# Patient Record
Sex: FEMALE | Race: WHITE | NOT HISPANIC OR LATINO | Employment: FULL TIME
[De-identification: names, ages, dates, MRNs, and addresses within clinical notes are randomized per-mention and may not be internally consistent; named-entity substitution may affect disease eponyms.]

---

## 2015-08-03 LAB
HPV_EXT - HISTORICAL: NORMAL
PAP SMEAR - HIM PATIENT REPORTED: NORMAL

## 2017-12-13 ENCOUNTER — RECORDS - HEALTHEAST (OUTPATIENT)
Dept: ADMINISTRATIVE | Facility: OTHER | Age: 37
End: 2017-12-13

## 2019-08-01 ENCOUNTER — OFFICE VISIT - HEALTHEAST (OUTPATIENT)
Dept: FAMILY MEDICINE | Facility: CLINIC | Age: 39
End: 2019-08-01

## 2019-08-01 DIAGNOSIS — M79.641 PAIN OF RIGHT HAND: ICD-10-CM

## 2019-08-01 ASSESSMENT — MIFFLIN-ST. JEOR: SCORE: 1495.86

## 2019-08-22 ENCOUNTER — AMBULATORY - HEALTHEAST (OUTPATIENT)
Dept: FAMILY MEDICINE | Facility: CLINIC | Age: 39
End: 2019-08-22

## 2019-08-22 ENCOUNTER — OFFICE VISIT - HEALTHEAST (OUTPATIENT)
Dept: FAMILY MEDICINE | Facility: CLINIC | Age: 39
End: 2019-08-22

## 2019-08-22 DIAGNOSIS — J45.990 EXERCISE-INDUCED ASTHMA: ICD-10-CM

## 2019-08-22 DIAGNOSIS — E66.811 CLASS 1 OBESITY DUE TO EXCESS CALORIES WITHOUT SERIOUS COMORBIDITY WITH BODY MASS INDEX (BMI) OF 33.0 TO 33.9 IN ADULT: ICD-10-CM

## 2019-08-22 DIAGNOSIS — E66.09 CLASS 1 OBESITY DUE TO EXCESS CALORIES WITHOUT SERIOUS COMORBIDITY WITH BODY MASS INDEX (BMI) OF 33.0 TO 33.9 IN ADULT: ICD-10-CM

## 2019-08-22 DIAGNOSIS — Z00.00 ROUTINE GENERAL MEDICAL EXAMINATION AT A HEALTH CARE FACILITY: ICD-10-CM

## 2019-08-22 DIAGNOSIS — M79.641 PAIN OF RIGHT HAND: ICD-10-CM

## 2019-08-22 RX ORDER — ALBUTEROL SULFATE 90 UG/1
1-2 AEROSOL, METERED RESPIRATORY (INHALATION) EVERY 4 HOURS PRN
Qty: 1 INHALER | Refills: 0 | Status: SHIPPED | OUTPATIENT
Start: 2019-08-22 | End: 2022-12-12

## 2019-08-22 ASSESSMENT — MIFFLIN-ST. JEOR: SCORE: 1494.47

## 2019-09-03 ENCOUNTER — RECORDS - HEALTHEAST (OUTPATIENT)
Dept: HEALTH INFORMATION MANAGEMENT | Facility: CLINIC | Age: 39
End: 2019-09-03

## 2019-12-16 ENCOUNTER — COMMUNICATION - HEALTHEAST (OUTPATIENT)
Dept: HEALTH INFORMATION MANAGEMENT | Facility: CLINIC | Age: 39
End: 2019-12-16

## 2020-07-23 ENCOUNTER — COMMUNICATION - HEALTHEAST (OUTPATIENT)
Dept: SCHEDULING | Facility: CLINIC | Age: 40
End: 2020-07-23

## 2020-07-24 ENCOUNTER — OFFICE VISIT - HEALTHEAST (OUTPATIENT)
Dept: FAMILY MEDICINE | Facility: CLINIC | Age: 40
End: 2020-07-24

## 2020-07-24 DIAGNOSIS — M25.511 ACUTE PAIN OF RIGHT SHOULDER: ICD-10-CM

## 2020-07-24 RX ORDER — NAPROXEN 500 MG/1
500 TABLET ORAL 2 TIMES DAILY WITH MEALS
Qty: 60 TABLET | Refills: 2 | Status: SHIPPED | OUTPATIENT
Start: 2020-07-24

## 2020-09-23 ENCOUNTER — OFFICE VISIT - HEALTHEAST (OUTPATIENT)
Dept: FAMILY MEDICINE | Facility: CLINIC | Age: 40
End: 2020-09-23

## 2020-09-23 DIAGNOSIS — G89.29 CHRONIC RIGHT SHOULDER PAIN: ICD-10-CM

## 2020-09-23 DIAGNOSIS — M25.511 CHRONIC RIGHT SHOULDER PAIN: ICD-10-CM

## 2020-10-16 ENCOUNTER — OFFICE VISIT - HEALTHEAST (OUTPATIENT)
Dept: PHYSICAL THERAPY | Facility: REHABILITATION | Age: 40
End: 2020-10-16

## 2020-10-16 DIAGNOSIS — M25.611 DECREASED ROM OF RIGHT SHOULDER: ICD-10-CM

## 2020-10-16 DIAGNOSIS — M62.81 GENERALIZED MUSCLE WEAKNESS: ICD-10-CM

## 2020-10-16 DIAGNOSIS — G89.29 CHRONIC RIGHT SHOULDER PAIN: ICD-10-CM

## 2020-10-16 DIAGNOSIS — M25.511 CHRONIC RIGHT SHOULDER PAIN: ICD-10-CM

## 2020-10-20 ENCOUNTER — OFFICE VISIT - HEALTHEAST (OUTPATIENT)
Dept: PHYSICAL THERAPY | Facility: REHABILITATION | Age: 40
End: 2020-10-20

## 2020-10-20 DIAGNOSIS — G89.29 CHRONIC RIGHT SHOULDER PAIN: ICD-10-CM

## 2020-10-20 DIAGNOSIS — M25.511 CHRONIC RIGHT SHOULDER PAIN: ICD-10-CM

## 2020-10-20 DIAGNOSIS — M25.611 DECREASED ROM OF RIGHT SHOULDER: ICD-10-CM

## 2020-10-20 DIAGNOSIS — M62.81 GENERALIZED MUSCLE WEAKNESS: ICD-10-CM

## 2020-10-27 ENCOUNTER — OFFICE VISIT - HEALTHEAST (OUTPATIENT)
Dept: PHYSICAL THERAPY | Facility: REHABILITATION | Age: 40
End: 2020-10-27

## 2020-10-27 DIAGNOSIS — G89.29 CHRONIC RIGHT SHOULDER PAIN: ICD-10-CM

## 2020-10-27 DIAGNOSIS — M25.611 DECREASED ROM OF RIGHT SHOULDER: ICD-10-CM

## 2020-10-27 DIAGNOSIS — M25.511 CHRONIC RIGHT SHOULDER PAIN: ICD-10-CM

## 2020-10-27 DIAGNOSIS — M62.81 GENERALIZED MUSCLE WEAKNESS: ICD-10-CM

## 2020-10-30 ENCOUNTER — OFFICE VISIT - HEALTHEAST (OUTPATIENT)
Dept: PHYSICAL THERAPY | Facility: REHABILITATION | Age: 40
End: 2020-10-30

## 2020-10-30 DIAGNOSIS — M62.81 GENERALIZED MUSCLE WEAKNESS: ICD-10-CM

## 2020-10-30 DIAGNOSIS — G89.29 CHRONIC RIGHT SHOULDER PAIN: ICD-10-CM

## 2020-10-30 DIAGNOSIS — M25.511 CHRONIC RIGHT SHOULDER PAIN: ICD-10-CM

## 2020-10-30 DIAGNOSIS — M25.611 DECREASED ROM OF RIGHT SHOULDER: ICD-10-CM

## 2020-11-03 ENCOUNTER — OFFICE VISIT - HEALTHEAST (OUTPATIENT)
Dept: PHYSICAL THERAPY | Facility: REHABILITATION | Age: 40
End: 2020-11-03

## 2020-11-03 DIAGNOSIS — M25.611 DECREASED ROM OF RIGHT SHOULDER: ICD-10-CM

## 2020-11-03 DIAGNOSIS — M25.511 CHRONIC RIGHT SHOULDER PAIN: ICD-10-CM

## 2020-11-03 DIAGNOSIS — M62.81 GENERALIZED MUSCLE WEAKNESS: ICD-10-CM

## 2020-11-03 DIAGNOSIS — G89.29 CHRONIC RIGHT SHOULDER PAIN: ICD-10-CM

## 2020-11-06 ENCOUNTER — OFFICE VISIT - HEALTHEAST (OUTPATIENT)
Dept: PHYSICAL THERAPY | Facility: REHABILITATION | Age: 40
End: 2020-11-06

## 2020-11-06 DIAGNOSIS — M25.511 CHRONIC RIGHT SHOULDER PAIN: ICD-10-CM

## 2020-11-06 DIAGNOSIS — M62.81 GENERALIZED MUSCLE WEAKNESS: ICD-10-CM

## 2020-11-06 DIAGNOSIS — M25.611 DECREASED ROM OF RIGHT SHOULDER: ICD-10-CM

## 2020-11-06 DIAGNOSIS — G89.29 CHRONIC RIGHT SHOULDER PAIN: ICD-10-CM

## 2020-11-13 ENCOUNTER — OFFICE VISIT - HEALTHEAST (OUTPATIENT)
Dept: PHYSICAL THERAPY | Facility: REHABILITATION | Age: 40
End: 2020-11-13

## 2020-11-13 DIAGNOSIS — M25.511 CHRONIC RIGHT SHOULDER PAIN: ICD-10-CM

## 2020-11-13 DIAGNOSIS — G89.29 CHRONIC RIGHT SHOULDER PAIN: ICD-10-CM

## 2020-11-13 DIAGNOSIS — M25.611 DECREASED ROM OF RIGHT SHOULDER: ICD-10-CM

## 2020-11-13 DIAGNOSIS — M62.81 GENERALIZED MUSCLE WEAKNESS: ICD-10-CM

## 2020-11-20 ENCOUNTER — OFFICE VISIT - HEALTHEAST (OUTPATIENT)
Dept: PHYSICAL THERAPY | Facility: REHABILITATION | Age: 40
End: 2020-11-20

## 2020-11-20 DIAGNOSIS — M25.611 DECREASED ROM OF RIGHT SHOULDER: ICD-10-CM

## 2020-11-20 DIAGNOSIS — M25.511 CHRONIC RIGHT SHOULDER PAIN: ICD-10-CM

## 2020-11-20 DIAGNOSIS — G89.29 CHRONIC RIGHT SHOULDER PAIN: ICD-10-CM

## 2020-11-20 DIAGNOSIS — M62.81 GENERALIZED MUSCLE WEAKNESS: ICD-10-CM

## 2020-11-24 ENCOUNTER — OFFICE VISIT - HEALTHEAST (OUTPATIENT)
Dept: PHYSICAL THERAPY | Facility: REHABILITATION | Age: 40
End: 2020-11-24

## 2020-11-24 DIAGNOSIS — G89.29 CHRONIC RIGHT SHOULDER PAIN: ICD-10-CM

## 2020-11-24 DIAGNOSIS — M25.511 CHRONIC RIGHT SHOULDER PAIN: ICD-10-CM

## 2020-11-24 DIAGNOSIS — M25.611 DECREASED ROM OF RIGHT SHOULDER: ICD-10-CM

## 2020-11-24 DIAGNOSIS — M62.81 GENERALIZED MUSCLE WEAKNESS: ICD-10-CM

## 2020-12-04 ENCOUNTER — OFFICE VISIT - HEALTHEAST (OUTPATIENT)
Dept: PHYSICAL THERAPY | Facility: REHABILITATION | Age: 40
End: 2020-12-04

## 2020-12-04 DIAGNOSIS — M62.81 GENERALIZED MUSCLE WEAKNESS: ICD-10-CM

## 2020-12-04 DIAGNOSIS — G89.29 CHRONIC RIGHT SHOULDER PAIN: ICD-10-CM

## 2020-12-04 DIAGNOSIS — M25.611 DECREASED ROM OF RIGHT SHOULDER: ICD-10-CM

## 2020-12-04 DIAGNOSIS — M25.511 CHRONIC RIGHT SHOULDER PAIN: ICD-10-CM

## 2020-12-11 ENCOUNTER — OFFICE VISIT - HEALTHEAST (OUTPATIENT)
Dept: PHYSICAL THERAPY | Facility: REHABILITATION | Age: 40
End: 2020-12-11

## 2020-12-11 DIAGNOSIS — M25.511 CHRONIC RIGHT SHOULDER PAIN: ICD-10-CM

## 2020-12-11 DIAGNOSIS — M62.81 GENERALIZED MUSCLE WEAKNESS: ICD-10-CM

## 2020-12-11 DIAGNOSIS — M25.611 DECREASED ROM OF RIGHT SHOULDER: ICD-10-CM

## 2020-12-11 DIAGNOSIS — G89.29 CHRONIC RIGHT SHOULDER PAIN: ICD-10-CM

## 2021-05-28 ASSESSMENT — ASTHMA QUESTIONNAIRES: ACT_TOTALSCORE: 25

## 2021-05-31 NOTE — PROGRESS NOTES
ASSESSMENT/PLAN:    Pain of right hand  This is a pleasant 39-year-old female who presented today because of 3 months of right wrist pain and numbness.  The distribution of numbness is along the fourth and fifth finger with pain along the first and second finger of the right hand.  I suspect she may have some element of carpal tunnel syndrome although she does not have the classic presentation.  I recommend conservative therapy for now including wrist brace, overuse of the right wrist, ice, NSAIDs, proper ergonomics, and time.  If she is not better and I recommend seeing orthopedic specialist.  She verbalized understanding and agreed with the plan      CHIEF COMPLAINT:  Chief Complaint   Patient presents with     Hand Pain     right hand feels numb when sleeping. x2-3 month. pointer finger is painful        HISTORY OF PRESENT ILLNESS:  Brenda is a 39 y.o. female presenting to the clinic today for possible risk of carpal tunnel.     Carpal tunnel: The patient reports to be experiencing a numbing sensation on her right hand. She has been noticing it more recently for the past 2-3 months. The patient acknowledges that her pinky and ring finger would fall go numb at night and she would have to shake it out to get rid of the numbing sensation. She states that she struggles to open jars, and her knuckles would hurt when she does. She express tension, tightness and soreness on her shoulder muscles and right upper arm. The patient confirms that she feels pain and pressure on her elbow. The patient states that she has a small bump on the lateral side on her right wrist and wondering if this is contributing to her current symptoms of pain and paresthesia of the hand.  Patient is right-hand dominant.  About 13 years ago she was told she had carpal tunnel in the time she had her infant child.  The symptoms did improve until about 2 and half months ago.  No history of injury.  No change in physical activity.    REVIEW OF  "SYSTEMS:   Positive: numbing sensation on the right hand, knuckle pain, muscle strain.   All other systems are negative.    PFSH:  Works for [employer]ing company    TOBACCO USE:  Social History     Tobacco Use   Smoking Status Never Smoker   Smokeless Tobacco Never Used       VITALS:  Vitals:    08/01/19 1324   BP: 120/82   Pulse: 72   Temp: 97.9  F (36.6  C)   SpO2: 99%   Weight: 188 lb (85.3 kg)   Height: 5' 3.88\" (1.623 m)     Wt Readings from Last 3 Encounters:   08/01/19 188 lb (85.3 kg)   12/20/16 169 lb 6.4 oz (76.8 kg)   10/13/15 155 lb 2 oz (70.4 kg)       PHYSICAL EXAM:    Objective:    Physical Exam   Vitals:    08/01/19 1324   BP: 120/82   Pulse: 72   Temp: 97.9  F (36.6  C)   SpO2: 99%      Constitutional: Patient is oriented to person, place, and time. Patient appears well-developed and well-nourished. No distress.   Head: Normocephalic and atraumatic.   Right Ear: External ear normal.   Left Ear: External ear normal.   Nose: Nose normal.   Eyes: Conjunctivae and EOM are normal.  Right eye exhibits no discharge. Left eye exhibits no discharge. No scleral icterus.   Extremities: 5 mm lipoma on the ulnar aspect of right forearm, negative tingling test, negative fading test.   Phalen (acute wrist flexion maintained for 30 to 60 seconds) was negative     Tinel (tapping over a compressed nerve at the wrist) was negative      Results for orders placed or performed in visit on 12/20/16   Lipid Cascade   Result Value Ref Range    Cholesterol 198 <=199 mg/dL    Triglycerides 102 <=149 mg/dL    HDL Cholesterol 60 >=50 mg/dL    LDL Calculated 118 <=129 mg/dL    Patient Fasting > 8hrs? No    Comprehensive Metabolic Panel   Result Value Ref Range    Sodium 141 136 - 145 mmol/L    Potassium 4.1 3.5 - 5.0 mmol/L    Chloride 107 98 - 107 mmol/L    CO2 26 22 - 31 mmol/L    Anion Gap, Calculation 8 5 - 18 mmol/L    Glucose 99 70 - 125 mg/dL    BUN 11 8 - 22 mg/dL    Creatinine 0.81 0.60 - 1.10 mg/dL    GFR MDRD Af Amer >60 " >60 mL/min/1.73m2    GFR MDRD Non Af Amer >60 >60 mL/min/1.73m2    Bilirubin, Total 0.3 0.0 - 1.0 mg/dL    Calcium 9.7 8.5 - 10.5 mg/dL    Protein, Total 7.1 6.0 - 8.0 g/dL    Albumin 3.9 3.5 - 5.0 g/dL    Alkaline Phosphatase 65 45 - 120 U/L    AST 15 0 - 40 U/L    ALT 18 0 - 45 U/L       QUALITY MEASURES:  I have had an Advance Directives discussion with the patient.    ADDITIONAL HISTORY SUMMARIZED (2): None.  DECISION TO OBTAIN EXTRA INFORMATION (1): None.   RADIOLOGY TESTS (1): None.  LABS (1): None.  MEDICINE TESTS (1): None.  INDEPENDENT REVIEW (2 each): None.     Total data points:0    The visit lasted a total of 17 minutes face to face with the patient. Over 50% of the time was spent counseling and educating the patient about carpal tunnel management.     ICatrina, am scribing for and in the presence of, Dr. Ibarra.    IDr. Ibarra, personally performed the services described in this documentation, as scribed by Catrina Valentine in my presence, and it is both accurate and complete.    MEDICATIONS:  Current Outpatient Medications   Medication Sig Dispense Refill     levonorgestrel-ethinyl estradiol (ENPRESSE) 50-30 (6)/75-40 (5)/125-30(10) per tablet        albuterol (PROVENTIL HFA;VENTOLIN HFA) 90 mcg/actuation inhaler Inhale 1-2 puffs every 4 (four) hours as needed. 1 Inhaler 3     fluticasone (FLOVENT HFA) 110 mcg/actuation inhaler Inhale 2 puffs 2 (two) times a day. 1 Inhaler 0     No current facility-administered medications for this visit.

## 2021-06-03 VITALS — WEIGHT: 188 LBS | BODY MASS INDEX: 32.1 KG/M2 | HEIGHT: 64 IN

## 2021-06-03 VITALS — BODY MASS INDEX: 33.65 KG/M2 | WEIGHT: 189.9 LBS | HEIGHT: 63 IN

## 2021-06-05 VITALS
SYSTOLIC BLOOD PRESSURE: 110 MMHG | WEIGHT: 204.1 LBS | HEART RATE: 64 BPM | BODY MASS INDEX: 35.87 KG/M2 | DIASTOLIC BLOOD PRESSURE: 78 MMHG

## 2021-06-09 NOTE — TELEPHONE ENCOUNTER
Brenda is calling and states that she is having pain in arm right and has been present for 2 months.  Can move arm but when lifting gets shooting pain.  Upper arm is hurting.  Denies injury.  Denies fever cough and shortness of breath.      COVID 19 Nurse Triage Plan/Patient Instructions    Please be aware that novel coronavirus (COVID-19) may be circulating in the community. If you develop symptoms such as fever, cough, or SOB or if you have concerns about the presence of another infection including coronavirus (COVID-19), please contact your health care provider or visit [email or web address].     Disposition/Instructions    Virtual Visit with provider recommended. Reference Visit Selection Guide.    Thank you for taking steps to prevent the spread of this virus.  o Limit your contact with others.  o Wear a simple mask to cover your cough.  o Wash your hands well and often.    Resources    [facility]: About COVID-19: [email or web address]    CDC: What to Do If You're Sick: www.cdc.gov/coronavirus/2019-ncov/about/steps-when-sick.html    CDC: Ending Home Isolation: www.cdc.gov/coronavirus/2019-ncov/hcp/disposition-in-home-patients.html     CDC: Caring for Someone: www.cdc.gov/coronavirus/2019-ncov/if-you-are-sick/care-for-someone.html     [facility]: Interim Guidance for Hospital Discharge to Home: www.health.Pending sale to [facility].[place]/diseases/coronavirus/hcp/hospdischarge.pdf    [facility] clinical trials (COVID-19 research studies): [email or web address]-trials     Below are the COVID-19 hotlines at the [place] Department of Health ([facility]). Interpreters are available.   o For health questions: Call 074-204-3315 or 1-718.903.7334 (7 a.m. to 7 p.m.)  o For questions about schools and childcare: Call 661-845-3506 or 1-738.874.4719 (7 a.m. to 7 p.m.)       Reason for Disposition    [1] MODERATE pain (e.g., interferes with normal activities) AND [2] present > 3 days    Additional Information     "Negative: Shock suspected (e.g., cold/pale/clammy skin, too weak to stand, low BP, rapid pulse)    Negative: [1] Similar pain previously AND [2] it was from \"heart attack\"    Negative: [1] Similar pain previously AND [2] it was from \"angina\" AND [3] not relieved by nitroglycerin    Negative: Sounds like a life-threatening emergency to the triager    Negative: Difficulty breathing or unusual sweating (e.g., sweating without exertion)    Negative: [1] Age > 40 AND [2] associated chest or jaw pain AND [3] pain lasts > 5 minutes    Negative: [1] Age > 40 AND [2] no obvious cause AND [3] pain even when not moving the arm    (Exception: pain is clearly made worse by moving arm or bending neck)    Negative: [1] SEVERE pain AND [2] not improved 2 hours after pain medicine    Negative: [1] Red area or streak AND [2] fever    Negative: [1] Swollen joint AND [2] fever    Negative: Patient sounds very sick or weak to the triager    Negative: [1] Red area or streak AND [2] large (> 2 in. or 5 cm)    Negative: Entire arm is swollen    Negative: [1] Cast on wrist or arm AND [2] now increased pain    Negative: Weakness (i.e., loss of strength) in hand or fingers     (Exception: not truly weak; hand feels weak because of pain)    Negative: [1] Arm pains with exertion (e.g., walking) AND [2] pain goes away on resting AND [3] not present now    Negative: [1] Painful rash AND [2] multiple small blisters grouped together (i.e., dermatomal distribution or \"band\" or \"stripe\")    Negative: Looks like a boil, infected sore, deep ulcer or other infected rash (spreading redness, pus)    Negative: [1] Localized rash is very painful AND [2] no fever    Negative: Localized pain, redness or hard lump along vein    Negative: Numbness (i.e., loss of sensation) in hand or fingers    Protocols used: ARM PAIN-A-AH      "

## 2021-06-09 NOTE — PROGRESS NOTES
"Brenda Euceda is a 39 y.o. female who is being evaluated via a billable video visit.      The patient has been notified of following:     \"This video visit will be conducted via a call between you and your physician/provider. We have found that certain health care needs can be provided without the need for an in-person physical exam.  This service lets us provide the care you need with a video conversation.  If a prescription is necessary we can send it directly to your pharmacy.  If lab work is needed we can place an order for that and you can then stop by our lab to have the test done at a later time.    Video visits are billed at different rates depending on your insurance coverage. Please reach out to your insurance provider with any questions.    If during the course of the call the physician/provider feels a video visit is not appropriate, you will not be charged for this service.\"    Patient has given verbal consent to a Video visit? Yes  How would you like to obtain your AVS? AVS Preference: [email or web address].  If dropped by the video visit, the video invitation should be sent to: Text to cell phone: [phone]  Will anyone else be joining your video visit? No        Video Start Time: 9.34    Additional provider notes:   Patient complaints of right shoulder pain. This is evaluated as a personal injury though there was no injury. The pain is described as aching, sharp and shooting.  The onset of the pain was sudden, not related to any specific activity.  The pain occurs Intermittently especially with certain movements of the right shoulder.  Pain is located in the middle of the lateral aspect of the deltoid muscle.  Movements include stretching her arms behind her trying to unbuckle her bra attempting to  something from her back.  The pain does not last long. . No history of dislocation. Symptoms are aggravated by reaching especially towards the back.  Symptoms are diminished by  rest.   Limited activities " include: reaching behind her, but she does have some discomfort with consistent typing at work especially around the lateral aspect of the elbow. No stiffness is reported. 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  And she has not missed work.  Review of systems:  She does not have any pain in her neck, denies having any numbness or weaknesses.  Noted that she is able to do the other movements of the shoulder.  Does not have any fevers or chills.  No chest pains and no shortness of breath.     On examination:  GENERAL: Healthy, alert and no distress  EYES: Eyes grossly normal to inspection.   RESP: No increased work of breathing.    MS: decreased range of motion mostly limited to internal rotation with extension towards the back.  The rest of the shoulder motions were normal.  NEURO: Mentation and speech appropriate for age.  PSYCH: Mentation appears normal, affect normal/bright, judgement and insight intact, normal speech and appearance well-groomed  Assessment and plan:  1. Acute pain of right shoulder  - naproxen (NAPROSYN) 500 MG tablet; Take 1 tablet (500 mg total) by mouth 2 (two) times a day with meals.  Dispense: 60 tablet; Refill: 2  As the patient does not have any known injuries.  She does have some repetitive movements when she is working, but that does not really appear to be impacting on the shoulder.  I think that she does have some tendinitis may be happened when she was reaching towards the back.  We discussed the management.  Will consider physical therapy but in the meantime she will start to do home exercise regimen and take some NSAIDs and if those are not as helpful she will let me know for referral for physical therapy.    Video-Visit Details    Type of service:  Video Visit    Video End Time (time video stopped): 9:45 AM  Originating Location (pt. Location): Home    Distant Location (provider location):  [facility]/OB     Platform used for Video Visit:  Doximity      Yin Lizarraga MD

## 2021-06-11 NOTE — PROGRESS NOTES
Assessment/plan   Brenda Euceda is a 40 y.o. female with obesity and asthma (exercise induced) who is established to my practice.    Brenda was seen today for arm pain.    Diagnoses and all orders for this visit:    Chronic right shoulder pain  Ongoing pain since late May, now about 4 months.  Suspect frozen shoulder component in addition to a mild tendinitis given impingement symptoms today and restrictive passive range of motion especially with external rotation and limited internal rotation.  Two-week trial of naproxen made no change.  At this point I have advised physical therapy and we can consider imaging down the road if not improving as I do not suspect a bony component at this time.  No known injury.  -     Ambulatory referral to Adult PT- Internal    Follow up: 2 months, recheck/physical exam    Monica Luna MD    Subjective:      HPI: Brenda Euceda is a 40 y.o. female who is here for:    Chief Complaint   Patient presents with     Arm Pain     ongoing since May, not getting any better from 7/24 virtual visit       Right shoulder pain: Ongoing since May, right before Memorial day weekend.  Gradually worsening.  In July she had a virtual visit.     No known injury. The pain is described as dull, aching, sharp and shooting.  The pain occurs Intermittently especially with certain movements of the right shoulder, such as when she tries to lift her arm up along her ear, or externally rotating as if to throw Frisbee or trying to unbuckle her bra strap from the back.  Anything that stretches her arms behind her really cause discomfort and she feels restricted range of motion.  The pain does not last long but she does report about 45 seconds of her arm feeling limp and funny after that.  It then goes back to normal.    Pain is located in the middle of the lateral aspect of the deltoid muscle.  No history of dislocation. Symptoms are aggravated by reaching especially towards the back. Symptoms are  diminished by  rest.   No change with naproxen for 2 weeks in July.    Limited activities include: reaching behind her, but she does have some discomfort with consistent typing at work especially around the lateral aspect of the elbow (previous issues with cubital tunnel syndrome but not currently bothering her). No stiffness is reported. 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  And she has not missed work.    Review of systems:  She does not have any pain in her neck, denies having any numbness or weaknesses.  Noted that she is able to do the other movements of the shoulder.  Does not have any fevers or chills.  No chest pains and no shortness of breath.      Review of Systems:  Her asthma has been well controlled, ACT today is 25.  Not exercising much which is generally her trigger.  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  She is , has 3 daughters.  2 of her children are in competitive dance.  She is not physically active at this point but is interested to start using her home elliptical machine.  If no alcohol use.  No smoking or drug use.    Monica Luna MD       Medical History:   Problem list, PMH, PSH, FHx and allergies were reviewed and updated accordingly in EMR.    Medications:  Current Outpatient Medications   Medication Sig Dispense Refill     albuterol (PROAIR HFA;PROVENTIL HFA;VENTOLIN HFA) 90 mcg/actuation inhaler Inhale 1-2 puffs every 4 (four) hours as needed. 1 Inhaler 0     levonorgestrel-ethinyl estradiol (ENPRESSE) 50-30 (6)/75-40 (5)/125-30(10) per tablet        naproxen (NAPROSYN) 500 MG tablet Take 1 tablet (500 mg total) by mouth 2 (two) times a day with meals. 60 tablet 2     No current facility-administered medications for this visit.        Imaging & Labs reviewed this visit:     No results found for:  HGBA1C  No results found for this or any previous visit.  Lab Results   Component Value Date    TSH 1.1 05/01/2014         Objective:      Vitals:    09/23/20 0858   BP: 110/78   Pulse: 64   Weight: 204 lb 1.6 oz (92.6 kg)       Physical Exam:     General: Alert, no acute distress.   HEENT: normocephalic conjunctivae are clear  Neck: supple without adenopathy or thyromegaly.  Lungs: Good aeration bilaterally. Clear to auscultation without wheezes, rales or rhonci.   Heart: regular rate and rhythm, normal S1 and S2, no murmurs  Abdomen: soft and nontender  Skin: clear without rash or lesions  Neuro: alert, interactive moving all extremities equally, normal muscle tone in all 4 extremities  right Shoulder:  non-specific diffuse tenderness about the shoulder, negative for tenderness over the acromioclavicular joint, positive for impingement sign and radial pulse intact   She has limited passive range of motion at the end of her range of motion for shoulder flexion/extension and internal/external rotation.  Positive Kelley Albino and a painful arc.  Negative drop arm sign.    Monica Luna MD

## 2021-06-12 NOTE — PROGRESS NOTES
[facility] Daily Progress Progress Note    Patient Name: Brenda Euceda  Date: 10/20/2020  Visit #: 2  PTA visit #:  -  Referral Diagnosis: shoulder  Referring provider: Monica Luna MD  Visit Diagnosis:     ICD-10-CM    1. Chronic right shoulder pain  M25.511     G89.29    2. Decreased ROM of right shoulder  M25.611    3. Generalized muscle weakness  M62.81        No past medical history on file.      Assessment:   Patient comes in for first f/u appointment today. She tolerated session well but did get some referred symptoms into her right elbow arm with shoulder extension today. Her exercise was modified to elbow extended and this seemed to feel better. Will continue to work on ROM, strengthening, and decreasing inflammation.    HEP/POC compliance is  good .  Patient demonstrates understanding/independence with home program.  Patient is benefitting from skilled physical therapy and is making steady progress toward functional goals.  Patient is appropriate to continue with skilled physical therapy intervention, as indicated by initial plan of care.    Goal Status:  Pt. will demonstrate/verbalize independence in self-management of condition in : 12 weeks  Pt. will be independent with home exercise program in : 6 weeks  Pt. will have improved quality of sleep: with less pain;waking less times/night;in 6 weeks    Pt will: have pain-free R shoulder AROM within 10 weeks in order to return to PLOF.  Pt will: be able to dress/groom without difficulty from R shoulder within 10 weeks in order to improve her QOL.      Plan / Patient Education:     Continue with initial plan of care.  Progress with home program as tolerated.    Plan for next visit: pulley's, did she get pulley's? Re-test motion. How are exs going? Mobs. Shoulder flex and scaption to shoulder height    Subjective:     Pain Rating: 3-4    Feeling the same overall. Getting more pain at night. Compliant with HEP and icing.    Objective:  "    Some increased pain w/ shoulder ext today. Decreased pain w/ straight elbow.      Treatment Today:       Exercises:  Exercise #1: wall walk 5-8x  Comment #1: wand- abd, ER(supine) 5-8x  Exercise #2: towel IR 30\" holds  Comment #2: purchase [employer] system  Exercise #3: isometrics- flex, ext, IR, ER x10 with 5\" holds       TREATMENT MINUTES COMMENTS   Evaluation     Self-care/ Home management     Manual therapy     Neuromuscular Re-education     Therapeutic Activity     Therapeutic Exercises 28 Discussed progress. Objective measures taken. Progressed HEP- see flow sheet for details.    Gait training     Modality__________________     Performance Test           Total 28    Blank areas are intentional and mean the treatment did not include these items.       Neo [place], PT, DPT  10/20/2020    "

## 2021-06-12 NOTE — PROGRESS NOTES
"[facility] Daily Progress Note    Patient Name: Brenda Euceda  Date: 10/30/2020  Visit #: 4  PTA visit #:  -  Referral Diagnosis: shoulder  Referring provider: Monica Luna MD  Visit Diagnosis:     ICD-10-CM    1. Chronic right shoulder pain  M25.511     G89.29    2. Decreased ROM of right shoulder  M25.611    3. Generalized muscle weakness  M62.81        No past medical history on file.      Assessment:   Patient reports having a flare up after reaching into her backseat. She tolerated session well, including progression of her HEP today.    HEP/POC compliance is  good .  Patient demonstrates understanding/independence with home program.  Patient is benefitting from skilled physical therapy and is making steady progress toward functional goals.  Patient is appropriate to continue with skilled physical therapy intervention, as indicated by initial plan of care.    Goal Status:  Pt. will demonstrate/verbalize independence in self-management of condition in : 12 weeks  Pt. will be independent with home exercise program in : 6 weeks  Pt. will have improved quality of sleep: with less pain;waking less times/night;in 6 weeks    Pt will: have pain-free R shoulder AROM within 10 weeks in order to return to PLOF.  Pt will: be able to dress/groom without difficulty from R shoulder within 10 weeks in order to improve her QOL.      Plan / Patient Education:     Continue with initial plan of care.  Progress with home program as tolerated.    Plan for next visit: pulley's, did she get pulley's? Re-test motion. Mobs. S/L ER AROM.    Subjective:     Pain Ratin-3/10, \"dull\"    Feeling flared up this week. Was fine after MT last session but was sore a different day from the exercise.  Pulley's and new ice pack arrived last night.     Reached into her backseat the other day and flared up her shoulder. Got pain into her right elbow.    Objective:     No pain w/ UBE  R shoulder AROM:  Flexion- \"tight\" at " "end-range  ER: pain-free, WNL  abd- pain at 160 degrees. Able to get up to 175 but \"tight\" at end-range  IR- about L1- sore    Response to MT: no increase in symptoms    Treatment Today:        Exercises:  Exercise #1: wall walk 5-8x  Comment #1: wand- abd, ER(supine) 5-8x  Exercise #2: towel IR 30\" holds  Comment #2: pully system 3-5\"  Exercise #3: isometrics- flex, ext, IR, ER x10 with 5\" holds  Comment #3: flexion and scaption x10-15 w/ 5\" holds B       TREATMENT MINUTES COMMENTS   Evaluation     Self-care/ Home management     Manual therapy 16 In supine: Gr I-II GHJM PA's, inferior glides, and distraction to R shoulder.   Neuromuscular Re-education     Therapeutic Activity     Therapeutic Exercises 11 Discussed progress. Objective measures taken. Progressed HEP- see flow sheet for details. UBE 4' for warm-up (forward/backward).    Gait training     Modality__________________     Performance Test           Total 27    Blank areas are intentional and mean the treatment did not include these items.       Neo Cleveland, PT, DPT  10/30/2020    "

## 2021-06-12 NOTE — PROGRESS NOTES
[facility]  Shoulder Initial Evaluation    Patient Name: Brenda Euceda  Date of evaluation: 10/16/2020  Referral Diagnosis: Chronic right shoulder pain  Referring provider: Monica Luna MD  Visit Diagnosis:     ICD-10-CM    1. Chronic right shoulder pain  M25.511     G89.29    2. Decreased ROM of right shoulder  M25.611    3. Generalized muscle weakness  M62.81        History reviewed. No pertinent past medical history.   Assessment:      Brenda Euceda is a 40 y.o. female who presents to therapy today with chief complaints of right shoulder pain. Symptoms began May 2020 after reaching into her backseat.  Difficulty with reaching into cupboard, grooming/dressing, lifting, sleeping due to pain.  Pain symptoms are not improving.  Some difficulty with assessment today due to pain limiting her. Patient demonstrates signs and sx consistent with right RC tendonitis and frozen right shoulder. PT POC and goals have been discussed with patient and She  is agreeable to these. Patient appears motivated for physical therapy and is appropriate for skilled therapy services.    The POC is dynamic and will be modified on an ongoing basis.  Barriers to achieving goals as noted in the assessment section may affect outcome.  Prognosis to achieve goals is  good   Pt. is appropriate for skilled PT intervention as outlined in the Plan of Care (POC).  Pt. is a good candidate for skilled PT services to improve pain levels and function.    Goals:  Pt. will demonstrate/verbalize independence in self-management of condition in : 12 weeks  Pt. will be independent with home exercise program in : 6 weeks  Pt. will have improved quality of sleep: with less pain;waking less times/night;in 6 weeks    Pt will: have pain-free R shoulder AROM within 10 weeks in order to return to PLOF.  Pt will: be able to dress/groom without difficulty from R shoulder within 10 weeks in order to improve her QOL.      Patient's  expectations/goals are realistic.    Barriers to Learning or Achieving Goals:  Chronicity of the problem.       Plan / Patient Instructions:        Plan of Care:   Communication with: Referral Source  Patient Related Instruction: Nature of Condition;Precautions;Next steps;Treatment plan and rationale;Expected outcome;Self Care instruction;Basis of treatment;Body mechanics;Posture  Times per Week: 1-2  Number of Weeks: 12  Number of Visits: 12  Discharge Planning: when PT goals are met  Precautions / Restrictions : none per chart  Therapeutic Exercise: Stretching;ROM;Strengthening  Neuromuscular Reeducation: posture;core  Manual Therapy: soft tissue mobilization;myofascial release;joint mobilization;muscle energy  Equipment: theraband    POC and pathology of condition were reviewed with patient.  Pt. is in agreement with the Plan of Care  A Home Exercise Program (HEP) was initiated today.  Pt. was instructed in exercises by PT and patient was given a handout with detailed instructions.    Plan for next visit: pulley's, how are exs going? Re-test motion. Isometrics, scap retraction  .   Subjective:       Social information:   Occupation: /manager   Work Status:Working full time   Hobbies: walking, elliptical       History of Present Illness:    Brenda is a 40 y.o. female who presents to therapy today with complaints of anterior and lateral right shoulder pain. Recently her pain seems to be radiating into right elbow. Date of onset/duration of symptoms is May 2020. Onset was sudden after reaching back in her car for purse/bag that wasn't heavy. Symptoms are not improving. Sometimes her right hand falls asleep and it wakes her up. Denies numbness/tingling right now. Isn't icing or heating. Has tried a cream but that only provided relief for 10 minutes or so. Naproxyn didn't help.     Pain Rating:3  Pain rating at best: 2  Pain rating at worst: 7  Pain description: dull ache    Functional limitations are  described as occurring with: reaching into cupboard, grooming/dressing, lifting, sleeping          Objective:      Note: Items left blank indicates the item was not performed or not indicated at the time of the evaluation.    Patient Outcome Measures :    Shoulder Pain and Disability Index (SPADI) in %: 38     Scores range from 0-100%, where a score of 0% represents minimal pain and maximal function. The minimal clincically important difference is a score reduction of 10%.    Shoulder Examination  1. Chronic right shoulder pain     2. Decreased ROM of right shoulder     3. Generalized muscle weakness       Involved side: Right  Posture Observation:      General sitting posture is  fair.  Cervical Clearing: Normal    Shoulder/Elbow ROM    Date: 10/16/2020     Shoulder and Elbow ROM ( )   AROM in degrees AROM in degrees AROM in degrees    Right Left (WNL, pain-free) Right Left Right Left   Shoulder Flexion (0-180 ) 170- increase in pain        Shoulder Abduction (0-180 ) 170 increase in pain        Shoulder Extension (0-60 )         Shoulder ER (0-90 ) 35- limited by pain at 90 degrees abduction.         Shoulder IR (0-70 ) Lateral of L4-5 under rib        Shoulder IR/Ext         Elbow Flexion (150 )         Elbow Extension (0 )          PROM in degrees PROM in degrees PROM in degrees    Right Left Right Left Right Left   Shoulder Flexion (0-180 )         Shoulder Abduction (0-180 )         Shoulder Extension (0-60 )         Shoulder ER (0-90 )         Shoulder IR (0-70 )         Elbow Flexion (150 )         Elbow Extension (0 )           Shoulder/Elbow Strength   Date: 10/16/2020     Shoulder/Elbow Strength (/5)  Manual Muscle Test (MMT) MMT MMT MMT    Right Left Right Left Right Left   Shoulder Flexion 4, limited by pain        Supraspinatus 4, limited by pain        Shoulder Abduction 4, limited by pain        Shoulder Extension         Shoulder External Rotation         Shoulder Internal Rotation         Elbow  "Flexion 5        Elbow Extension         Other:         Other:           Palpation: tender R bicipital groove    Shoulder Special Tests     Impingement Cluster Right (+/-) Left (+/-) Rotator Cuff Tests Right (+/-) Left (+/-)   Kelley-Albino +  Drop Arm Sign -    Painful Arc +  Hornblowers     Infraspinatus Test +  ERLS -    AC Tests Right (+/-) Left (+/-) IRLS -    Active Compression   Labral Tests Right (+/-) Left (+/-)   Crossbody Adduction   Biceps Load Test II     AC Resisted Extension   Jerk Test     GH Instability Tests Right (+/-) Left (+/-) Griselda Test     Sulcus Sign   Biceps Tests Right (+/-) Left (+/-)   Apprehension   Speed +    Relocation   Aguiladottyjasmine pearson     Other:   Other: empty can +    Other:   Other:full can -        Treatment Today:   TREATMENT MINUTES COMMENTS   Evaluation 20 shoulder pain. Discussed PT POC and pathology of condition.    Self-care/ Home management 10 Answered patient questions regarding management of condition. Recommend patient ice daily. Discussed avoiding painful activities and move as she is able in a pain-free range. Also recommend she get pulley's for home.   Manual therapy     Neuromuscular Re-education     Therapeutic Activity     Therapeutic Exercises 10 Began HEP:  Exercises:  Exercise #1: wall walk 5-8x  Comment #1: wand- abd, ER(supine) 5-8x  Exercise #2: towel IR 30\" holds  Comment #2: purchase pully system      Gait training     Modality__________________                Total 40    Blank areas are intentional and mean the treatment did not include these items.     PT Evaluation Code: (Please list factors)  Patient History/Comorbidities: PMH  Examination: right shoulder pain  Clinical Presentation: stable  Clinical Decision Making: low    Patient History/  Comorbidities Examination  (body structures and functions, activity limitations, and/or participation restrictions) Clinical Presentation Clinical Decision Making (Complexity)   No documented Comorbidities or personal " factors 1-2 Elements Stable and/or uncomplicated Low   1-2 documented comorbidities or personal factor 3 Elements Evolving clinical presentation with changing characteristics Moderate   3-4 documented comorbidities or personal factors 4 or more Unstable and unpredictable High            Neo Cleveland, PT, DPT  10/16/2020  9:04 AM

## 2021-06-12 NOTE — PROGRESS NOTES
"[facility] Daily Progress Note    Patient Name: Brenda Euceda  Date: 11/6/2020  Visit #: 6  PTA visit #:  -  Referral Diagnosis: shoulder  Referring provider: Monica Luna MD  Visit Diagnosis:     ICD-10-CM    1. Chronic right shoulder pain  M25.511     G89.29    2. Decreased ROM of right shoulder  M25.611    3. Generalized muscle weakness  M62.81        No past medical history on file.      Assessment:   A trial of KT and IASTM were initiated today. She tolerated both well without any complications. Will see how she responds to these and continue as appropriate, along with progression of her HEP.     HEP/POC compliance is  good .  Patient demonstrates understanding/independence with home program.  Patient is benefitting from skilled physical therapy and is making steady progress toward functional goals.  Patient is appropriate to continue with skilled physical therapy intervention, as indicated by initial plan of care.    Goal Status:  Pt. will demonstrate/verbalize independence in self-management of condition in : 12 weeks  Pt. will be independent with home exercise program in : 6 weeks  Pt. will have improved quality of sleep: with less pain;waking less times/night;in 6 weeks    Pt will: have pain-free R shoulder AROM within 10 weeks in order to return to PLOF.  Pt will: be able to dress/groom without difficulty from R shoulder within 10 weeks in order to improve her QOL.      Plan / Patient Education:     Continue with initial plan of care.  Progress with home program as tolerated.    Plan for next visit: how are lat. Epicondylitis band and wrist splint going? Is wrist or shoulder worse today? ASTM to right wrist extensors, KT R wrist extensors. R wrist AROM exs w/ 1# weight if tolerated. Add S/L shoulder ER. Try TE ball exs or bent at counter shoulder ext and abd (stop isometrics).    Subjective:     Pain Rating: elbow- 1, shoulder- \"feels fine today\"     Hasn't been able to get brace or " "band yet. Has been icing elbow and shoulder. Putting arm around [employer] drinks in car increase elbow and shoulder pain. R wrist extensor stretch and nerve glides felt good when she had some tingling.      Objective:     Tender R lateral epicondyle    Response to MT: no increase in symptoms. Some redness over R wrist extensors    Treatment Today:        Exercises:  Exercise #1: wall walk 5-8x  Comment #1: wand- abd, ER(supine) 5-8x  Exercise #2: towel IR 30\" holds  Comment #2: pully system 3-5\"  Exercise #3: isometrics- flex, ext, IR, ER x10 with 5\" holds  Comment #3: flexion and scaption x10-15 w/ 5\" holds B  Exercise #4: R wrist extensor stretch 30\" holds x2-3       TREATMENT MINUTES COMMENTS   Evaluation     Self-care/ Home management 10 Discussed icing, progress, bracing subjective measures.   Manual therapy 10 IASTM to R wrist extensors. Indications and precautions reviewed.   Neuromuscular Re-education 8 KT \"I\" strip with inhibition technique. Precautions of KT were reviewed with patient today and handout with those precautions was given. Patient appears to understand these precautions.   Therapeutic Activity     Therapeutic Exercises     Gait training     Modality__________________     Performance Test           Total 28    Blank areas are intentional and mean the treatment did not include these items.       Neo Cleveland, PT, DPT  11/6/2020    "

## 2021-06-12 NOTE — PROGRESS NOTES
[facility] Daily Progress Note    Patient Name: Brenda Euceda  Date: 11/3/2020  Visit #: 5  PTA visit #:  -  Referral Diagnosis: shoulder  Referring provider: Monica Luna MD  Visit Diagnosis:     ICD-10-CM    1. Chronic right shoulder pain  M25.511     G89.29    2. Decreased ROM of right shoulder  M25.611    3. Generalized muscle weakness  M62.81        No past medical history on file.      Assessment:   Patient reports having more elbow pain and numbness into her right 4th-5th digits. Upon exam today, she appears to have right lateral epicondylitis. I think some of her elbow/upper arm pain is from her shoulder and some could be from her lateral epicondylitis. Since her elbow is the worst right now, we focused on that during today's session. We discussed that she can get a MRI of her shoulder as well, but she would like to wait for now and continue with therapy.     HEP/POC compliance is  good .  Patient demonstrates understanding/independence with home program.  Patient is benefitting from skilled physical therapy and is making steady progress toward functional goals.  Patient is appropriate to continue with skilled physical therapy intervention, as indicated by initial plan of care.    Goal Status:  Pt. will demonstrate/verbalize independence in self-management of condition in : 12 weeks  Pt. will be independent with home exercise program in : 6 weeks  Pt. will have improved quality of sleep: with less pain;waking less times/night;in 6 weeks    Pt will: have pain-free R shoulder AROM within 10 weeks in order to return to PLOF.  Pt will: be able to dress/groom without difficulty from R shoulder within 10 weeks in order to improve her QOL.      Plan / Patient Education:     Continue with initial plan of care.  Progress with home program as tolerated.    Plan for next visit: how are lat. Epicondylitis band and wrist splint going? ASTM to right wrist extensors, KT R wrist extensors. R wrist AROM  "exs.    Subjective:     Pain Ratin-3/10, \"sore\"    Not feeling any better since beginning therapy. New ice pack makes it more painful. Exercises make it more sore after.    Does get some 4th-5th digit numbness pain      Objective:     Response to UBE: soreness in lateral R deltoid area    R shoulder AROM:  Flexion- \"tight\" at end-range  ER: pain-free, WNL  abd- pain at 160 degrees. Able to get up to 175 but \"tight\" at end-range  IR- about L1- sore    Tender R lateral epicondyle  Some mild increase in pain with R wrist ext  R UE neural tension testing: unable to differentiate shoulder and nerve pain with median and ulnar nerve positions. Negative radial nerve tension testing.    Response to MT: no increase in symptoms    Treatment Today:        Exercises:  Exercise #1: wall walk 5-8x  Comment #1: wand- abd, ER(supine) 5-8x  Exercise #2: towel IR 30\" holds  Comment #2: pully system 3-5\"  Exercise #3: isometrics- flex, ext, IR, ER x10 with 5\" holds  Comment #3: flexion and scaption x10-15 w/ 5\" holds B       TREATMENT MINUTES COMMENTS   Evaluation     Self-care/ Home management 29 Discussed lateral epicondylitis, bracing, night splint, icing elbow, ergonomics at work station at home, MRI for shoulder as needed. Answered patient questions/concersn.   Manual therapy     Neuromuscular Re-education     Therapeutic Activity     Therapeutic Exercises     Gait training     Modality__________________     Performance Test 10 Objective measures taken         Total 39    Blank areas are intentional and mean the treatment did not include these items.       Neo Cleveland, PT, DPT  11/3/2020    "

## 2021-06-12 NOTE — PROGRESS NOTES
[facility] Daily Progress Progress Note    Patient Name: Brenda Euceda  Date: 10/27/2020  Visit #: 3  PTA visit #:  -  Referral Diagnosis: shoulder  Referring provider: Monica Luna MD  Visit Diagnosis:     ICD-10-CM    1. Chronic right shoulder pain  M25.511     G89.29    2. Decreased ROM of right shoulder  M25.611    3. Generalized muscle weakness  M62.81        No past medical history on file.      Assessment:   Patient reports having a flare up over last weekend but isn't exactly sure what it was from. She demonstrated improvements in her ROM today from her initial evaluation, and even more motion after manual therapy. I do think she is improving but progress is slow, as anticipated.    HEP/POC compliance is  good .  Patient demonstrates understanding/independence with home program.  Patient is benefitting from skilled physical therapy and is making steady progress toward functional goals.  Patient is appropriate to continue with skilled physical therapy intervention, as indicated by initial plan of care.    Goal Status:  Pt. will demonstrate/verbalize independence in self-management of condition in : 12 weeks  Pt. will be independent with home exercise program in : 6 weeks  Pt. will have improved quality of sleep: with less pain;waking less times/night;in 6 weeks    Pt will: have pain-free R shoulder AROM within 10 weeks in order to return to PLOF.  Pt will: be able to dress/groom without difficulty from R shoulder within 10 weeks in order to improve her QOL.      Plan / Patient Education:     Continue with initial plan of care.  Progress with home program as tolerated.    Plan for next visit: pulley's, did she get pulley's? Re-test motion. Mobs. Shoulder flex and scaption to shoulder height    Subjective:     Pain Ratin/10    Exercises were fine last Saturday but woke up  and had constant pain. Had to take some Advil which helped. Ice didn't help. Hasn't purchased pulley's yet.  "Hasn't done exs since Sunday due to pain.    Objective:     No pain w/ UBE  R shoulder AROM:  Flexion- \"tight\" at end-range  ER: pain-free  abd- pain at 160 degrees. Able to get up to 175 but \"tight\" at end-range  IR- about L1- pain and tightness    IR after MT: up to T11        Treatment Today:        Exercises:  Exercise #1: wall walk 5-8x  Comment #1: wand- abd, ER(supine) 5-8x  Exercise #2: towel IR 30\" holds  Comment #2: purchase [employer] system  Exercise #3: isometrics- flex, ext, IR, ER x10 with 5\" holds       TREATMENT MINUTES COMMENTS   Evaluation     Self-care/ Home management     Manual therapy 14 In supine: Gr I-II GHJM PA's, inferior glides, and distraction to R shoulder.   Neuromuscular Re-education     Therapeutic Activity     Therapeutic Exercises 18 Discussed progress. Objective measures taken. Progressed HEP- see flow sheet for details. UBE 4' for warm-up (forward/backward).    Gait training     Modality__________________     Performance Test           Total 32    Blank areas are intentional and mean the treatment did not include these items.       Neo Cleveland, PT, DPT  10/27/2020    "

## 2021-06-13 NOTE — PROGRESS NOTES
[facility] Discharge Summary  Patient Name: Brenda Euceda  Date: 2/5/2021  Referral Diagnosis: [unfilled]  Referring provider: Monica Luna MD  Visit Diagnosis:   1. Chronic right shoulder pain     2. Decreased ROM of right shoulder     3. Generalized muscle weakness         Goals:  Pt. will demonstrate/verbalize independence in self-management of condition in : 12 weeks;Met  Pt. will be independent with home exercise program in : 6 weeks;Met  Pt. will have improved quality of sleep: with less pain;waking less times/night;in 6 weeks;Met in this session    Pt will: have pain-free R shoulder AROM within 10 weeks in order to return to PLOF. GOAL MET  Pt will: be able to dress/groom without difficulty from R shoulder within 10 weeks in order to improve her QOL. PARTIALLY MET- better but still improving      Patient was seen for 11 visits.  The patient met goals and has demonstrated understanding of and independence in the home program for self-care, and progression to next steps.  She will initiate contact if questions or concerns arise.  The patient was issued a HEP and instructed in proper usage.  No further therapy is required at this time.    Therapy will be discontinued at this time.  The patient will need a new referral to resume.    Thank you for your referral.  Neo Cleveland, PT, DPT  2/5/2021  10:25 AM    [facility] Daily Progress Note    Patient Name: Brenda Euceda  Date: 2/5/2021  Visit #: 11/12  PTA visit #:  -  Referral Diagnosis: shoulder  Referring provider: Monica Luna MD  Visit Diagnosis:     ICD-10-CM    1. Chronic right shoulder pain  M25.511     G89.29    2. Decreased ROM of right shoulder  M25.611    3. Generalized muscle weakness  M62.81        No past medical history on file.      Assessment:   Patient reports her shoulder and elbow feeling significantly better since beginning therapy. She has been very compliant with her HEP and therapy  "sessions. Will hold her chart open for 30 days- if she does not return to clinic or make contact with PT during that time, she will be discharged and will need a new order to resume in the future. Patient is agreeable to this plan.     HEP/POC compliance is  good .  Patient demonstrates understanding/independence with home program.  Patient is benefitting from skilled physical therapy and is making steady progress toward functional goals.  Patient is appropriate to continue with skilled physical therapy intervention, as indicated by initial plan of care.    Goal Status:  Pt. will demonstrate/verbalize independence in self-management of condition in : 12 weeks;Met  Pt. will be independent with home exercise program in : 6 weeks;Met  Pt. will have improved quality of sleep: with less pain;waking less times/night;in 6 weeks;Met in this session    Pt will: have pain-free R shoulder AROM within 10 weeks in order to return to PLOF. GOAL MET  Pt will: be able to dress/groom without difficulty from R shoulder within 10 weeks in order to improve her QOL. PARTIALLY MET- better but still improving      Plan / Patient Education:     Continue with initial plan of care.  Progress with home program as tolerated.    Plan for next visit: hold chart open 30 days.     Subjective:     Pain Rating: elbow- 0, shoulder- 0/10    Things are going well over all. Sometimes gets tenderness or moves a certain way and gets increased pain. Close to 100% progress but not there yet.       Objective:     R shoulder AROM: all pain-free. Mildly limited end-range IR AROM due to weakness  R elbow AROM: pain-free, all WNL  Neg neural tension testing into R UE    Treatment Today:        Exercises:  Exercise #1: wall walk 5-8x  Comment #1: wand- abd, ER(supine) 5-8x- HOLD  Exercise #2: towel IR 30\" holds  Comment #2: pully system 3-5\"- DO AS NEEDED  Exercise #3: S/L ER w/ 2# x15  Comment #3: flexion and scaption x10-15 w/ 5\" holds B w/ 2#  Exercise #4: R " "wrist extensor stretch 30\" holds x2-3  Comment #4: wrist flex/ext w/ 2# weight. x15-30 reps  Exercise #5: biceps curl w/ 5#  Comment #5: x15-30  Exercise #6: on TE ball: T's, Y's, shoulder ext until fatigue, 3 days/week- try soup can       TREATMENT MINUTES COMMENTS   Evaluation     Self-care/ Home management 12 Discussed progress, home management, HEP, and POC   Manual therapy 14 In supine: Gr II AP's, inferior glides, and distraction to R GHJ.    Neuromuscular Re-education      Therapeutic Activity     Therapeutic Exercises     Gait training     Modality__________________     Performance Test           Total 26    Blank areas are intentional and mean the treatment did not include these items.       Neo Cleveland, PT, DPT  2/5/2021    "

## 2021-06-13 NOTE — PROGRESS NOTES
[facility] Daily Progress Note    Patient Name: Brenda Euceda  Date: 11/13/2020  Visit #: 7  PTA visit #:  -  Referral Diagnosis: shoulder  Referring provider: Monica Luna MD  Visit Diagnosis:     ICD-10-CM    1. Chronic right shoulder pain  M25.511     G89.29    2. Decreased ROM of right shoulder  M25.611    3. Generalized muscle weakness  M62.81        No past medical history on file.      Assessment:   Patient reports new injury after falling on her garage steps and reinjured her shoulder. Her HEP was progressed for her elbow but now her shoulder due to flare up today. She tolerated MT well today.    HEP/POC compliance is  good .  Patient demonstrates understanding/independence with home program.  Patient is benefitting from skilled physical therapy and is making steady progress toward functional goals.  Patient is appropriate to continue with skilled physical therapy intervention, as indicated by initial plan of care.    Goal Status:  Pt. will demonstrate/verbalize independence in self-management of condition in : 12 weeks  Pt. will be independent with home exercise program in : 6 weeks  Pt. will have improved quality of sleep: with less pain;waking less times/night;in 6 weeks    Pt will: have pain-free R shoulder AROM within 10 weeks in order to return to PLOF.  Pt will: be able to dress/groom without difficulty from R shoulder within 10 weeks in order to improve her QOL.      Plan / Patient Education:     Continue with initial plan of care.  Progress with home program as tolerated.    Plan for next visit: How's shoulder doing? Able to get back to shoulder exs without pain? ASTM to right wrist extensors, KT R wrist extensors. Add S/L shoulder ER. Try TE ball exs or bent at counter shoulder ext and abd (stop isometrics). Shoulder mobs as needed.    Subjective:     Pain Rating: elbow- 1, shoulder- 3-4/10    Fell a few days ago and re-injured R shoulder. Has had a ton of R shoulder pain since  "that injury and had difficulty getting exercises in. Has been icing. KT stayed on well.       Objective:     Tender R lateral epicondyle  R shoulder AROM:  abd- 170- limited by pain in lateral del end range and 100-150 degrees. \"stuck\"  flex- 170- limited by pain in lateral deltoid.  and \"stuck\"  ER- C5, pain into superior right lateral deltoid  IR: limited to L3 due to pain    Response to MT: no increase in symptoms. Some redness over R wrist extensors    Treatment Today:        Exercises:  Exercise #1: wall walk 5-8x  Comment #1: wand- abd, ER(supine) 5-8x  Exercise #2: towel IR 30\" holds  Comment #2: pully system 3-5\"  Exercise #3: isometrics- flex, ext, IR, ER x10 with 5\" holds  Comment #3: flexion and scaption x10-15 w/ 5\" holds B  Exercise #4: R wrist extensor stretch 30\" holds x2-3  Comment #4: wrist flex/ext w/ 2# weight. x15-30 reps  Exercise #5: biceps curl w/ 5# x15-30       TREATMENT MINUTES COMMENTS   Evaluation     Self-care/ Home management     Manual therapy 25 IASTM to R wrist extensors. Indications and precautions reviewed.    In supine: Gr I-II GH JM inferior glichanda, PA's and distraction.   Neuromuscular Re-education 8 KT \"I\" strip with inhibition technique. Precautions of KT were reviewed with patient today and handout with those precautions was given. Patient appears to understand these precautions.   Therapeutic Activity     Therapeutic Exercises 12 Progressed HEP- see flow sheet. Objective measures taken.   Gait training     Modality__________________     Performance Test           Total 50    Blank areas are intentional and mean the treatment did not include these items.       Neo Cleveland, PT, DPT  11/13/2020    "

## 2021-06-13 NOTE — PROGRESS NOTES
[facility] Daily Progress Note    Patient Name: Brenda Euceda  Date: 12/4/2020  Visit #: 10/12  PTA visit #:  -  Referral Diagnosis: shoulder  Referring provider: Monica Luna MD  Visit Diagnosis:     ICD-10-CM    1. Chronic right shoulder pain  M25.511     G89.29    2. Decreased ROM of right shoulder  M25.611    3. Generalized muscle weakness  M62.81        No past medical history on file.      Assessment:   Patient continues to demonstrate significant improvements in her shoulder pain, elbow pain, and AROM. She reports about 80% progress thus far. Will continue to progress her HEP.     HEP/POC compliance is  good .  Patient demonstrates understanding/independence with home program.  Patient is benefitting from skilled physical therapy and is making steady progress toward functional goals.  Patient is appropriate to continue with skilled physical therapy intervention, as indicated by initial plan of care.    Goal Status:  Pt. will demonstrate/verbalize independence in self-management of condition in : 12 weeks;Progressing toward  Pt. will be independent with home exercise program in : 6 weeks;Met  Pt. will have improved quality of sleep: with less pain;waking less times/night;in 6 weeks;Progressing toward    Pt will: have pain-free R shoulder AROM within 10 weeks in order to return to PLOF. PROGRESSING TOWARDS  Pt will: be able to dress/groom without difficulty from R shoulder within 10 weeks in order to improve her QOL. PROGRESSING TOWARDS      Plan / Patient Education:     Continue with initial plan of care.  Progress with home program as tolerated.    Plan for next visit: did she add weight to ext and Y's? Try W's again. IR w/ band. Does she want more exs cut from program?  Mobs as needed.  Shoulder mobs as needed. Push next appt out a few weeks or hold chart open.     Subjective:     Pain Rating: elbow- 0, shoulder- 0/10    Shoulder seems to be better. Her shoulder AROM seems to be better  "and her pain is less after trying to avoid sleeping on her right side. Has been getting some R elbow soreness and has noticed some numbness/tingling into R 4th-5th digits.     Exercises are going well- no issues. Compliant with HEP. Feeling about 80% better since beginning therapy.      Objective:     R shoulder AROM: all pain-free. Mildly limited end-range IR AROM due to weakness  R elbow AROM: pain-free, all WNL  Neg neural tension testing into R UE    Treatment Today:        Exercises:  Exercise #1: wall walk 5-8x  Comment #1: wand- abd, ER(supine) 5-8x- HOLD  Exercise #2: towel IR 30\" holds  Comment #2: pully system 3-5\"- DO AS NEEDED  Exercise #3: S/L ER w/ 2# x15  Comment #3: flexion and scaption x10-15 w/ 5\" holds B w/ 2#  Exercise #4: R wrist extensor stretch 30\" holds x2-3  Comment #4: wrist flex/ext w/ 2# weight. x15-30 reps  Exercise #5: biceps curl w/ 5#  Comment #5: x15-30  Exercise #6: on TE ball: T's, Y's, shoulder ext until fatigue, 3 days/week- try soup can       TREATMENT MINUTES COMMENTS   Evaluation     Self-care/ Home management 16 Discussed progress, home management, and HEP   Manual therapy 11 ASTM to R forearm   Neuromuscular Re-education      Therapeutic Activity     Therapeutic Exercises     Gait training     Modality__________________     Performance Test           Total 27    Blank areas are intentional and mean the treatment did not include these items.       Neo Cleveland, PT, DPT  12/4/2020    "

## 2021-06-13 NOTE — PROGRESS NOTES
"[facility] Daily Progress Note    Patient Name: Brenda Euceda  Date: 11/20/2020  Visit #: 8  PTA visit #:  -  Referral Diagnosis: shoulder  Referring provider: Monica Luna MD  Visit Diagnosis:     ICD-10-CM    1. Chronic right shoulder pain  M25.511     G89.29    2. Decreased ROM of right shoulder  M25.611    3. Generalized muscle weakness  M62.81        No past medical history on file.      Assessment:   Patient reports improvement in her right elbow pain. However, her shoulder still limits her ADL's and will get bouts of pain down into distal biceps. She tolerated progression of her HEP well today with exception of \"T's\" on TE ball, which were terminated.     HEP/POC compliance is  good .  Patient demonstrates understanding/independence with home program.  Patient is benefitting from skilled physical therapy and is making steady progress toward functional goals.  Patient is appropriate to continue with skilled physical therapy intervention, as indicated by initial plan of care.    Goal Status:  Pt. will demonstrate/verbalize independence in self-management of condition in : 12 weeks  Pt. will be independent with home exercise program in : 6 weeks  Pt. will have improved quality of sleep: with less pain;waking less times/night;in 6 weeks    Pt will: have pain-free R shoulder AROM within 10 weeks in order to return to PLOF.  Pt will: be able to dress/groom without difficulty from R shoulder within 10 weeks in order to improve her QOL.      Plan / Patient Education:     Continue with initial plan of care.  Progress with home program as tolerated.    Plan for next visit: check shoulder and elbow ROM. KT R wrist extensors as needed. How are TE ball exs and S/L ER going? Try TE ball T's again. Shoulder mobs as needed.    Subjective:     Pain Rating: elbow- 0, shoulder- 3-4/10    Elbow is better. Tool and taping seemed to have helped.     Shoulder is doing ok. Sometimes gets shock of pain in distal " "biceps without even doing anything. Was really sore a day or two ago and had really bad achy pain. Still having limitations with ADL's due to shoulder pain.       Objective:     Response to MT: no increase in symptoms.   Increased pain w/ T's on TE ball; terminated.      Treatment Today:        Exercises:  Exercise #1: wall walk 5-8x  Comment #1: wand- abd, ER(supine) 5-8x  Exercise #2: towel IR 30\" holds  Comment #2: pully system 3-5\"  Exercise #3: S/L ER w/ 2# x15  Comment #3: flexion and scaption x10-15 w/ 5\" holds B w/ 2#  Exercise #4: R wrist extensor stretch 30\" holds x2-3  Comment #4: wrist flex/ext w/ 2# weight. x15-30 reps  Exercise #5: biceps curl w/ 5# x15-30       TREATMENT MINUTES COMMENTS   Evaluation     Self-care/ Home management     Manual therapy 12 In supine: Gr I-II GH JM inferior glides, PA's and distraction.   Neuromuscular Re-education 8 KT \"I\" strip with inhibition technique.    Therapeutic Activity     Therapeutic Exercises 20 Progressed HEP- see flow sheet. Objective measures taken.   Gait training     Modality__________________     Performance Test           Total 40    Blank areas are intentional and mean the treatment did not include these items.       Neo Cleveland, PT, DPT  11/20/2020    "

## 2021-06-16 PROBLEM — E66.09 CLASS 1 OBESITY DUE TO EXCESS CALORIES WITHOUT SERIOUS COMORBIDITY WITH BODY MASS INDEX (BMI) OF 33.0 TO 33.9 IN ADULT: Status: ACTIVE | Noted: 2019-08-22

## 2021-06-16 PROBLEM — E66.811 CLASS 1 OBESITY DUE TO EXCESS CALORIES WITHOUT SERIOUS COMORBIDITY WITH BODY MASS INDEX (BMI) OF 33.0 TO 33.9 IN ADULT: Status: ACTIVE | Noted: 2019-08-22

## 2021-06-16 PROBLEM — J45.990 EXERCISE-INDUCED ASTHMA: Status: ACTIVE | Noted: 2019-08-22

## 2021-06-18 NOTE — PATIENT INSTRUCTIONS - HE
Patient Instructions by Neo Cleveland PT at 11/13/2020  8:30 AM     Author: Neo Cleveland PT Service: -- Author Type: Physical Therapist    Filed: 11/13/2020  9:21 AM Encounter Date: 11/13/2020 Status: Signed    : Neo Cleveland PT (Physical Therapist)           WRIST FLEXION    Hold a small free weight, rest your forearm on a table and bend your wrist up and down with your palm face up as shown. Use 2# weight. Do 15-30 repetitions, 3 days/week         WRIST EXTENSION    Hold a small free weight, rest your forearm on a table and bend your wrist up and down with your palm face down as shown. Use 2# weight. Do 15-30 repetitions, 3 days/week                DUMBBELL - BICEP CURLS - SEATED    While sitting down in a chair, hold a 5# free weight / dumbbell so that your elbow is straight and your palm is pointed forward.    Next, bend your elbow and lift up the free weight. Do not rotate your forearm so that your palm is pointed upward at the peak of the elbow bend. Lower back down and repeat.    x15-30 repetitions  Perform 3 days/week

## 2021-06-18 NOTE — PATIENT INSTRUCTIONS - HE
Patient Instructions by Neo Cleveland PT at 11/3/2020  9:00 AM     Author: Neo Cleveland PT Service: -- Author Type: Physical Therapist    Filed: 11/3/2020  9:31 AM Encounter Date: 11/3/2020 Status: Signed    : Neo Cleveland PT (Physical Therapist)         Get a wrist splint to wear at night that puts your wrist in a neutral position. ONLY wear this at night.      Get a lateral epicondylitis band. Wear this ONLY during the day    It is recommended that you ice your elbow 2x/day for 20 minutes at a time. Remember to not apply ice directly on skin.    Check your wrist/elbow position at your work station. Be sure your wrist is in a neutral position.        WRIST EXTENSOR STRETCH    Use your unaffected hand to bend the affected wrist down as shown.     Keep the elbow straight on the affected side the entire time. Hold for 20-30 seconds. Do 2-3 repetitions at a time, 2-3x/day     If you start to feel numbness/tingling into your 4th-5th fingers, start to do 20 of these:

## 2021-06-20 NOTE — LETTER
Letter by María Holloway at      Author: María Holloway Service: -- Author Type: --    Filed:  Encounter Date: 12/16/2019 Status: Signed         December 16, 2019       Brenda Euceda  [address]  [facility] [address]    Dear Brenda Euceda:    We are pleased to provide you with secure, online access to medical information for you and your family within [employer]. Per your request, we have expanded your account to allow access to the records of the following family members:                        Margarita BOCANEGRA Michelinejason (privilege ends on 4/19/2024.)             How Do I Log In?  1. In your Internet browser, go to [email or web address]  2. Log into [employer] using your [employer] Username and Password.  3. Click Sign In.        How Do I Access a Family Member's Account?  4. Select the account you want to access by clicking the Elem with the appropriate patient's name at the top of your screen.   5. You will see a disclaimer page letting you know that you will be viewing a family member's record. Review the disclaimer and then click Accept Proxy Access Disclaimer to proceed.  6. Once you switch to viewing a family member's record, you can navigate to [employer] pages the same way you would for yourself. You can return to your own account by clicking the Elem at the top of the screen with your name on it.    7. To customize the colors and names of the linked accounts, you can select Personalize from the Profile dropdown menu at the top of the screen, then click the Edit button to make changes.     Additional Information  If you have questions, visit [email or web address], e-mail [email or web address] or call [phone] to talk to our [employer] staff. Remember, [employer] is NOT to be used for urgent needs. For medical emergencies, dial 911.

## 2021-06-27 ENCOUNTER — HEALTH MAINTENANCE LETTER (OUTPATIENT)
Age: 41
End: 2021-06-27

## 2021-06-27 NOTE — PROGRESS NOTES
Progress Notes by Monica Luna MD at 8/22/2019 12:20 PM     Author: Monica Luna MD Service: -- Author Type: Physician    Filed: 8/22/2019  4:21 PM Encounter Date: 8/22/2019 Status: Signed    : Monica Luna MD (Physician)       FEMALE PREVENTATIVE EXAM    Assessment and Plan:     1. Routine general medical examination at a health care facility  Pap is up-to-date, done last year at her gynecology clinic.  Requesting outside records.  Discussed mammograms to start at age 40-50.  She is going to think about this.  She is not fasting today and will return for labs.  - Tdap vaccine greater than or equal to 6yo IM  - Hemoglobin; Future  - Lipid Cascade; Future  - Glucose; Future  - Continue birth control (currently getting via her ob/gyn)    2. Exercise-induced asthma  ACT 25.  Discussed exercise and use of albuterol inhaler prior to this.  - albuterol (PROAIR HFA;PROVENTIL HFA;VENTOLIN HFA) 90 mcg/actuation inhaler; Inhale 1-2 puffs every 4 (four) hours as needed.  Dispense: 1 Inhaler; Refill: 0    3. Pain of right hand  It did review with her that the numbness and tingling she experiences in digits 4 and 5 is consistent with an ulnar neuropathy with compression at the elbow particularly while she is sleeping.  Recommended elbow brace for this.  For the pain in her thumb, could consider thumb osteoarthritis.  She is going to proceed with conservative management (wrist brace, limiting overuse, ice, NSAIDs, proper ergonomics) as this is been a chronic issue for 13 years.  We can place a hand at all at some point if she changes her mind.    4. Class 1 obesity due to excess calories without serious comorbidity.  BMI 33.  Risks of obesity were discussed, including co-morbidities such as diabetes, HTN, HLD, CAD and stroke.   - encouraged moderate physical activity of 150 minutes per week  - encouraged healthy dietary choices like eating real foods, increasing protein & vegetables, and watching  portion sizes.    Next follow up:  Return in about 1 year (around 8/22/2020) for Annual physical, or sooner if symptoms not improving.    Immunization Review  Adult Imm Review: Due today, orders placed    I discussed the following with the patient:   Adult Healthy Living: Importance of regular exercise  Healthy nutrition  Weight loss referral options  Getting adequate sleep  Stress management  Distracted driving  Contraception options  Supplement use      Subjective:   Chief Complaint: Brenda Euceda is an 39 y.o. female here for a preventative health visit.     HPI: No specific concerns.      Hand pain: She is right handed. She did bring up a chronic issue with her hand pain that she had seen a provider for a few weeks ago.  She describes having right elbow pain with numbness and tingling of the fourth and fifth digits of the right hand.  This wakes her up from sleep at night and if she shakes out her elbow the symptoms improve.  She was recommended to wear an elbow brace but has not tried this yet.  Additionally for the past 13 years she has had issues with her right thumb and has tried to adjust her work environment to include more ergonomically friendly changes including special mouse and keyboard.  She describes pain at the thumb of the base and between the first and second fingers.  No swelling.  Normal range of motion.    H/o exercise induced asthma: dx late high school. uses albuterol prior to rollerblading or biking. Used one inhaler in the past 2 years.     On birth control. Regular menses. Not heavy nor painful.   Pap done in 2018 at [facility]- Deena Peña NP.   She is Rh- and has gotten rhogam in the past.   Doesn't recall about Tdap status.         Healthy Habits  Are you taking a daily aspirin? No  Do you typically exercising at least 40 min, 3-4 times per week?  NO  Do you usually eat at least 4 servings of fruit and vegetables a day, include whole grains and fiber and avoid  "regularly eating high fat foods? NO  Have you had an eye exam in the past two years? Yes  Do you see a dentist twice per year? Yes  Do you have any concerns regarding sleep? No    Safety Screen  If you own firearms, are they secured in a locked gun cabinet or with trigger locks? The patient does not own any firearms  Do you feel you are safe where you are living?: Yes (8/22/2019 12:34 PM)  Do you feel you are safe in your relationship(s)?: Yes (8/22/2019 12:34 PM)      Review of Systems: Right hand: pinky and 4th digit fall asleep at night. Soreness around the pinky PIP.  Also having issues since birth of her 12yo with right thumb pain.   Please see above.  The rest of the review of systems are negative for all systems.       Cancer Screening       Status Date      PAP SMEAR Postponed 8/28/2020 Originally 7/27/2008. Waiting for Documentation/Doc Correction          Patient Care Team:  Monica Luna MD as PCP - General (Family Medicine)        History     Reviewed By Date/Time Sections Reviewed    Monica Luna MD 8/22/2019  4:13 PM Medical, Surgical, Tobacco, Family    Monica Luna MD 8/22/2019  1:10 PM Social Documentation    Jenny Bates LPN 8/22/2019 12:31 PM Tobacco            Objective:   Vital Signs:   Visit Vitals  /80 (Patient Site: Left Arm, Patient Position: Sitting, Cuff Size: Adult Large)   Pulse 68   Ht 5' 3.25\" (1.607 m)   Wt 189 lb 14.4 oz (86.1 kg)   LMP 08/14/2019 (Exact Date)   BMI 33.37 kg/m           PHYSICAL EXAM  Constitutional: Patient is oriented to person, place, and time. Patient appears well-developed and well-nourished. No distress.   Head: Normocephalic and atraumatic.   Ears: External ear and TMs normal bilaterally.  Nose: Nose normal.   Mouth/Throat: Oropharynx is clear and moist. No oropharyngeal exudate.   Eyes: Conjunctivae and EOM are normal. Pupils are equal, round, and reactive to light. No discharge. No scleral icterus.   Neck: Neck supple. " No JVD present. No tracheal deviation present. No thyromegaly present.  Breasts: not indicated based on USPSTF recommendations for asymptomatic women  Cardiovascular: Normal rate, regular rhythm, normal heart sounds and intact distal pulses. No murmur heard.   Pulmonary/Chest: Effort normal and breath sounds normal. Patient has no wheezes, no rales, exhibits no tenderness.   Abdominal: Soft. Bowel sounds are normal. No masses. There is no tenderness.   Lymphadenopathy:  Patient has no cervical adenopathy.   Neurological: Patient is alert and oriented to person, place, and time. Patient has normal reflexes. No cranial nerve deficit. Coordination normal.   Skin: Skin is warm and dry. No rash noted. No pallor.  She does have a few lipomatous structures that are nickel sized or smaller her right upper extremity.  Pelvic: not indicated based on USPSTF recommendations for asymptomatic women  Psychiatric: Patient has good eye contact without any psychomotor retardation or stereotypic behaviors.  normal mood and affect. Judgment and thought content normal.   Speech is regular rate and rhythm.   MSK: normal right upper arm/hand-no obvious swelling, no specific areas of tenderness aside from the area she is described in HPI including base of the right thumb at MCP, as well as pinky PIP.     The 10-year ASCVD risk score (Haven ADALID Jr., et al., 2013) is: 0.4%    Values used to calculate the score:      Age: 39 years      Sex: Female      Is Non- : No      Diabetic: No      Tobacco smoker: No      Systolic Blood Pressure: 110 mmHg      Is BP treated: No      HDL Cholesterol: 60 mg/dL      Total Cholesterol: 198 mg/dL         Medication List           Accurate as of 8/22/19  4:20 PM. If you have any questions, ask your nurse or doctor.               CONTINUE taking these medications    albuterol 90 mcg/actuation inhaler  Also known as:  PROAIR HFA;PROVENTIL HFA;VENTOLIN HFA  INSTRUCTIONS:  Inhale 1-2 puffs  every 4 (four) hours as needed.  Doctor's comments:  May substitute the equivalent medication per insurance preference.        levonorgestrel-ethinyl estradiol 50-30 (6)/75-40 (5)/125-30(10) per tablet  Also known as:  ENPRESSE           STOP taking these medications    fluticasone propionate 110 mcg/actuation inhaler  Also known as:  FLOVENT HFA  Stopped by:  Monica Luna MD           Where to Get Your Medications      [id number] [place] [address] [address]    Phone:  [phone]     albuterol 90 mcg/actuation inhaler         Additional Screenings Completed Today:   In the past 4 weeks, how much of the time did your asthma keep you from getting as much done at work, school, or at home?: None of the time  During the past 4 weeks, how often have you had shortness of breath?: Not at all  During the past 4 weeks, how often did your asthma symptoms (wheezing, coughing, shortness of breath, chest tightness or pain) wake you up at night or earlier in the morning?: Not at all  During the past 4 weeks, how often have you used your rescue inhaler or nebulizer medication (such as albuterol)?: Not at all  How would you rate your asthma control during the past 4 weeks?: Completely controlled  ACT Total Score: 25  In the past 12 months, have you visited the emergency room due to your asthma?: No  In the past 12 months, have you been hospitalized due to your asthma?: No

## 2021-10-17 ENCOUNTER — HEALTH MAINTENANCE LETTER (OUTPATIENT)
Age: 41
End: 2021-10-17

## 2022-07-23 ENCOUNTER — HEALTH MAINTENANCE LETTER (OUTPATIENT)
Age: 42
End: 2022-07-23

## 2022-10-01 ENCOUNTER — HEALTH MAINTENANCE LETTER (OUTPATIENT)
Age: 42
End: 2022-10-01

## 2022-12-12 ENCOUNTER — OFFICE VISIT (OUTPATIENT)
Dept: FAMILY MEDICINE | Facility: CLINIC | Age: 42
End: 2022-12-12
Payer: COMMERCIAL

## 2022-12-12 VITALS
SYSTOLIC BLOOD PRESSURE: 124 MMHG | RESPIRATION RATE: 18 BRPM | HEIGHT: 64 IN | TEMPERATURE: 98.1 F | HEART RATE: 79 BPM | OXYGEN SATURATION: 100 % | WEIGHT: 213.4 LBS | DIASTOLIC BLOOD PRESSURE: 76 MMHG | BODY MASS INDEX: 36.43 KG/M2

## 2022-12-12 DIAGNOSIS — E66.812 CLASS 2 OBESITY DUE TO EXCESS CALORIES WITHOUT SERIOUS COMORBIDITY WITH BODY MASS INDEX (BMI) OF 36.0 TO 36.9 IN ADULT: ICD-10-CM

## 2022-12-12 DIAGNOSIS — Z00.00 ROUTINE GENERAL MEDICAL EXAMINATION AT A HEALTH CARE FACILITY: Primary | ICD-10-CM

## 2022-12-12 DIAGNOSIS — Z12.31 ENCOUNTER FOR SCREENING MAMMOGRAM FOR BREAST CANCER: ICD-10-CM

## 2022-12-12 DIAGNOSIS — J45.990 EXERCISE-INDUCED ASTHMA: ICD-10-CM

## 2022-12-12 DIAGNOSIS — Z12.4 SCREENING FOR CERVICAL CANCER: ICD-10-CM

## 2022-12-12 DIAGNOSIS — E66.09 CLASS 2 OBESITY DUE TO EXCESS CALORIES WITHOUT SERIOUS COMORBIDITY WITH BODY MASS INDEX (BMI) OF 36.0 TO 36.9 IN ADULT: ICD-10-CM

## 2022-12-12 DIAGNOSIS — D17.30 LIPOMA OF SKIN AND SUBCUTANEOUS TISSUE: ICD-10-CM

## 2022-12-12 LAB
CHOLEST SERPL-MCNC: 208 MG/DL
ERYTHROCYTE [DISTWIDTH] IN BLOOD BY AUTOMATED COUNT: 12.1 % (ref 10–15)
HBA1C MFR BLD: 5.2 % (ref 0–5.6)
HCT VFR BLD AUTO: 37.9 % (ref 35–47)
HDLC SERPL-MCNC: 61 MG/DL
HGB BLD-MCNC: 13.1 G/DL (ref 11.7–15.7)
LDLC SERPL CALC-MCNC: 130 MG/DL
MCH RBC QN AUTO: 29.8 PG (ref 26.5–33)
MCHC RBC AUTO-ENTMCNC: 34.6 G/DL (ref 31.5–36.5)
MCV RBC AUTO: 86 FL (ref 78–100)
NONHDLC SERPL-MCNC: 147 MG/DL
PLATELET # BLD AUTO: 228 10E3/UL (ref 150–450)
RBC # BLD AUTO: 4.39 10E6/UL (ref 3.8–5.2)
TRIGL SERPL-MCNC: 83 MG/DL
WBC # BLD AUTO: 7.3 10E3/UL (ref 4–11)

## 2022-12-12 PROCEDURE — 87624 HPV HI-RISK TYP POOLED RSLT: CPT | Performed by: FAMILY MEDICINE

## 2022-12-12 PROCEDURE — G0145 SCR C/V CYTO,THINLAYER,RESCR: HCPCS | Performed by: FAMILY MEDICINE

## 2022-12-12 PROCEDURE — 85027 COMPLETE CBC AUTOMATED: CPT | Performed by: FAMILY MEDICINE

## 2022-12-12 PROCEDURE — 36415 COLL VENOUS BLD VENIPUNCTURE: CPT | Performed by: FAMILY MEDICINE

## 2022-12-12 PROCEDURE — 83036 HEMOGLOBIN GLYCOSYLATED A1C: CPT | Performed by: FAMILY MEDICINE

## 2022-12-12 PROCEDURE — 99396 PREV VISIT EST AGE 40-64: CPT | Performed by: FAMILY MEDICINE

## 2022-12-12 PROCEDURE — 99213 OFFICE O/P EST LOW 20 MIN: CPT | Mod: 25 | Performed by: FAMILY MEDICINE

## 2022-12-12 PROCEDURE — 80061 LIPID PANEL: CPT | Performed by: FAMILY MEDICINE

## 2022-12-12 RX ORDER — ALBUTEROL SULFATE 90 UG/1
1-2 AEROSOL, METERED RESPIRATORY (INHALATION) EVERY 4 HOURS PRN
Qty: 18 G | Refills: 0 | Status: SHIPPED | OUTPATIENT
Start: 2022-12-12 | End: 2024-02-15

## 2022-12-12 ASSESSMENT — ENCOUNTER SYMPTOMS
SORE THROAT: 0
PALPITATIONS: 0
ARTHRALGIAS: 0
ABDOMINAL PAIN: 0
MYALGIAS: 0
DIARRHEA: 0
SHORTNESS OF BREATH: 0
PARESTHESIAS: 0
HEARTBURN: 0
EYE PAIN: 0
HEMATURIA: 0
HEADACHES: 0
FEVER: 0
DYSURIA: 0
CONSTIPATION: 0
NERVOUS/ANXIOUS: 0
FREQUENCY: 0
COUGH: 0
CHILLS: 0
BREAST MASS: 0
WEAKNESS: 0
NAUSEA: 0
JOINT SWELLING: 0
DIZZINESS: 0
HEMATOCHEZIA: 0

## 2022-12-12 ASSESSMENT — ASTHMA QUESTIONNAIRES
QUESTION_2 LAST FOUR WEEKS HOW OFTEN HAVE YOU HAD SHORTNESS OF BREATH: NOT AT ALL
ACT_TOTALSCORE: 25
QUESTION_1 LAST FOUR WEEKS HOW MUCH OF THE TIME DID YOUR ASTHMA KEEP YOU FROM GETTING AS MUCH DONE AT WORK, SCHOOL OR AT HOME: NONE OF THE TIME
QUESTION_4 LAST FOUR WEEKS HOW OFTEN HAVE YOU USED YOUR RESCUE INHALER OR NEBULIZER MEDICATION (SUCH AS ALBUTEROL): NOT AT ALL
QUESTION_5 LAST FOUR WEEKS HOW WOULD YOU RATE YOUR ASTHMA CONTROL: COMPLETELY CONTROLLED
QUESTION_3 LAST FOUR WEEKS HOW OFTEN DID YOUR ASTHMA SYMPTOMS (WHEEZING, COUGHING, SHORTNESS OF BREATH, CHEST TIGHTNESS OR PAIN) WAKE YOU UP AT NIGHT OR EARLIER THAN USUAL IN THE MORNING: NOT AT ALL
ACT_TOTALSCORE: 25

## 2022-12-12 ASSESSMENT — PAIN SCALES - GENERAL: PAINLEVEL: NO PAIN (0)

## 2022-12-12 NOTE — PROGRESS NOTES
SUBJECTIVE:   CC: Brenda is an 42 year old who presents for preventive health visit.   Patient has been advised of split billing requirements and indicates understanding: Yes  Healthy Habits:     Getting at least 3 servings of Calcium per day:  Yes    Bi-annual eye exam:  Yes    Dental care twice a year:  Yes    Sleep apnea or symptoms of sleep apnea:  None    Diet:  Regular (no restrictions)    Frequency of exercise:  1 day/week    Duration of exercise:  15-30 minutes    Taking medications regularly:  Not Applicable    Medication side effects:  Not applicable    PHQ-2 Total Score: 0    Additional concerns today:  No    Chief Complaint   Patient presents with     Well Adult       H/o exercise induced asthma: dx late high school. uses albuterol prior to rollerblading or biking- hasn't been exercising much so not needing. Used one inhaler in the past 3 years.      Regular menses. Not heavy nor painful.   She stopped her birth control pills about 1 year ago.  She was mostly using it for period regularity.    Pap done in 2018 at [facility]- Deena Peña NP.     She has elected out of COVID vaccine series.    She notices a metallic smell now when she walks into a coffee shop  Or if something is burning (something about the coffee bean roasting process);   8 months ago when she made scrambled eggs it tasted metallic.   She can't recall a + Covid illness      She indicates having a 3D mammogram done Dec 2018 and a follow up in 2019 at an outside imaging center.     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  She is , has 3 daughters.  2 of her children are in competitive dance.  She is not physically active at this point but is interested to start using her home elliptical ma chine.  If no  alcohol use.  No smoking or drug use.  Monica Luna MD           Today's PHQ-2 Score:   PHQ-2 ( 1999 Pfizer) 12/12/2022   Q1: Little interest or pleasure in doing things 0   Q2: Feeling down, depressed or hopeless 0   PHQ-2 Score 0   Q1: Little interest or pleasure in doing things Not at all   Q2: Feeling down, depressed or hopeless Not at all   PHQ-2 Score 0         Social History     Tobacco Use     Smoking status: Never     Smokeless tobacco: Never   Substance Use Topics     Alcohol use: No       Alcohol Use 12/12/2022   Prescreen: >3 drinks/day or >7 drinks/week? No     Reviewed orders with patient.  Reviewed health maintenance and updated orders accordingly -     Breast Cancer Screening:  Any new diagnosis of family breast, ovarian, or bowel cancer? No    FHS-7: No flowsheet data found.    Pertinent mammograms are reviewed under the imaging tab.    History of abnormal Pap smear: NO - age 30-65 PAP every 5 years with negative HPV co-testing recommended  PAP / HPV 8/3/2015   HPV See Scanned Report     Reviewed and updated as needed this visit by clinical staff   Tobacco  Allergies  Meds              Reviewed and updated as needed this visit by Provider                     Review of Systems   Constitutional: Negative for chills and fever.   HENT: Negative for congestion, ear pain, hearing loss and sore throat.    Eyes: Negative for pain and visual disturbance.   Respiratory: Negative for cough and shortness of breath.    Cardiovascular: Negative for chest pain, palpitations and peripheral edema.   Gastrointestinal: Negative for abdominal pain, constipation, diarrhea, heartburn, hematochezia and nausea.   Breasts:  Negative for tenderness, breast mass and discharge.   Genitourinary: Negative for dysuria, frequency, genital sores, hematuria, pelvic pain, urgency, vaginal bleeding and vaginal discharge.   Musculoskeletal: Negative for arthralgias, joint swelling and myalgias.   Skin: Negative for rash.  "  Neurological: Negative for dizziness, weakness, headaches and paresthesias.   Psychiatric/Behavioral: Negative for mood changes. The patient is not nervous/anxious.         OBJECTIVE:   /76 (BP Location: Left arm, Patient Position: Left side, Cuff Size: Adult Regular)   Pulse 79   Temp 98.1  F (36.7  C) (Oral)   Resp 18   Ht 1.623 m (5' 3.9\")   Wt 96.8 kg (213 lb 6.4 oz)   LMP 11/25/2022 (Approximate)   SpO2 100%   BMI 36.75 kg/m    Physical Exam  GENERAL: healthy, alert and no distress  EYES: Eyes grossly normal to inspection, PERRL and conjunctivae and sclerae normal  HENT: ear canals and TM's normal, nose and mouth without ulcers or lesions  NECK: no adenopathy, no asymmetry, masses, or scars and thyroid normal to palpation  RESP: lungs clear to auscultation - no rales, rhonchi or wheezes  CV: regular rate and rhythm, normal S1 S2, no S3 or S4, no murmur, click or rub, no peripheral edema and peripheral pulses strong  ABDOMEN: soft, nontender, no hepatosplenomegaly, no masses and bowel sounds normal   (female): normal female external genitalia, normal urethral meatus, vaginal mucosa pink, moist, well rugated, and normal cervix/adnexa/uterus without masses or discharge  MS: no gross musculoskeletal defects noted, no edema  SKIN: soft tissue lump noted in the right forearm approx 3cm in size and a smaller lump that is soft/mobile at the right lateral wrist area  NEURO: Normal strength and tone, mentation intact and speech normal  PSYCH: mentation appears normal, affect normal/bright    Diagnostic Test Results:  Labs reviewed in Epic    ASSESSMENT/PLAN:   Brenda was seen today for well adult.    Diagnoses and all orders for this visit:    Routine general medical examination at a health care facility  -     albuterol (PROAIR HFA/PROVENTIL HFA/VENTOLIN HFA) 108 (90 Base) MCG/ACT inhaler; Inhale 1-2 puffs into the lungs every 4 hours as needed  -     Hemoglobin A1c; Future  -     Lipid panel reflex " to direct LDL Non-fasting; Future  -     CBC with platelets; Future  -     Pap screen with HPV - recommended age 30 - 65 years    Class 2 obesity due to excess calories without serious comorbidity with body mass index (BMI) of 36.0 to 36.9 in adult  Risks of obesity were discussed, including co-morbidities such as diabetes, HTN, HLD, CAD and stroke.   - encouraged moderate physical activity  - encouraged healthy dietary choices like eating real foods, increasing protein & vegetables, and watching portion sizes.    Exercise-induced asthma  ACT 25, well controlled  -     albuterol (PROAIR HFA/PROVENTIL HFA/VENTOLIN HFA) 108 (90 Base) MCG/ACT inhaler; Inhale 1-2 puffs into the lungs every 4 hours as needed    Encounter for screening mammogram for breast cancer  -     MA Screen Bilateral w/Christian; Future    Screening for cervical cancer  -     Pap screen with HPV - recommended age 30 - 65 years    Lipoma of skin and subcutaneous tissue  Suspect lipomas of right forearm; can proceed with ultrasound and surgery consult as one is starting to bother her more often and may wish to have removed  -     US Upper Extremity Non Vascular Right; Future  -     Adult General Surg Referral; Future        Patient has been advised of split billing requirements and indicates understanding: Yes      COUNSELING:  Reviewed preventive health counseling, as reflected in patient instructions        She reports that she has never smoked. She has never used smokeless tobacco.      Monica Luna MD  [facility]

## 2022-12-12 NOTE — LETTER
My Asthma Action Plan    Name: Brenda Euceda   YOB: 1980  Date: 12/12/2022   My doctor: Monica Luna MD   My clinic: [facility]        My Rescue Medicine:   Albuterol inhaler (Proair/Ventolin/Proventil HFA)  2-4 puffs EVERY 4 HOURS as needed. Use a spacer if recommended by your provider.   My Asthma Severity:   Intermittent / Exercise Induced  Know your asthma triggers: exercise or sports             GREEN ZONE   Good Control    I feel good    No cough or wheeze    Can work, sleep and play without asthma symptoms       Take your asthma control medicine every day.     1. If exercise triggers your asthma, take your rescue medication    15 minutes before exercise or sports, and    During exercise if you have asthma symptoms  2. Spacer to use with inhaler: If you have a spacer, make sure to use it with your inhaler             YELLOW ZONE Getting Worse  I have ANY of these:    I do not feel good    Cough or wheeze    Chest feels tight    Wake up at night   1. Keep taking your Green Zone medications  2. Start taking your rescue medicine:    every 20 minutes for up to 1 hour. Then every 4 hours for 24-48 hours.  3. If you stay in the Yellow Zone for more than 12-24 hours, contact your doctor.  4. If you do not return to the Green Zone in 12-24 hours or you get worse, start taking your oral steroid medicine if prescribed by your provider.           RED ZONE Medical Alert - Get Help  I have ANY of these:    I feel awful    Medicine is not helping    Breathing getting harder    Trouble walking or talking    Nose opens wide to breathe       1. Take your rescue medicine NOW  2. If your provider has prescribed an oral steroid medicine, start taking it NOW  3. Call your doctor NOW  4. If you are still in the Red Zone after 20 minutes and you have not reached your doctor:    Take your rescue medicine again and    Call 911 or go to the emergency room right away    See  your regular doctor within 2 weeks of an Emergency Room or Urgent Care visit for follow-up treatment.          Annual Reminders:  Meet with Asthma Educator,  Flu Shot in the Fall, consider Pneumonia Vaccination for patients with asthma (aged 19 and older).    [facility] [id number] [place] [address]    Electronically signed by Monica Luna MD   Date: 12/12/22                    Asthma Triggers  How To Control Things That Make Your Asthma Worse    Triggers are things that make your asthma worse.  Look at the list below to help you find your triggers and   what you can do about them. You can help prevent asthma flare-ups by staying away from your triggers.      Trigger                                                          What you can do   Cigarette Smoke  Tobacco smoke can make asthma worse. Do not allow smoking in your home, car or around you.  Be sure no one smokes at a child s day care or school.  If you smoke, ask your health care provider for ways to help you quit.  Ask family members to quit too.  Ask your health care provider for a referral to Quit Plan to help you quit smoking, or call 9-961-149-PLAN.     Colds, Flu, Bronchitis  These are common triggers of asthma. Wash your hands often.  Don t touch your eyes, nose or mouth.  Get a flu shot every year.     Dust Mites  These are tiny bugs that live in cloth or carpet. They are too small to see. Wash sheets and blankets in hot water every week.   Encase pillows and mattress in dust mite proof covers.  Avoid having carpet if you can. If you have carpet, vacuum weekly.   Use a dust mask and HEPA vacuum.   Pollen and Outdoor Mold  Some people are allergic to trees, grass, or weed pollen, or molds. Try to keep your windows closed.  Limit time out doors when pollen count is high.   Ask you health care provider about taking medicine during allergy season.     Animal Dander  Some people are allergic to skin flakes, urine or  saliva from pets with fur or feathers. Keep pets with fur or feathers out of your home.    If you can t keep the pet outdoors, then keep the pet out of your bedroom.  Keep the bedroom door closed.  Keep pets off cloth furniture and away from stuffed toys.     Mice, Rats, and Cockroaches  Some people are allergic to the waste from these pests.   Cover food and garbage.  Clean up spills and food crumbs.  Store grease in the refrigerator.   Keep food out of the bedroom.   Indoor Mold  This can be a trigger if your home has high moisture. Fix leaking faucets, pipes, or other sources of water.   Clean moldy surfaces.  Dehumidify basement if it is damp and smelly.   Smoke, Strong Odors, and Sprays  These can reduce air quality. Stay away from strong odors and sprays, such as perfume, powder, hair spray, paints, smoke incense, paint, cleaning products, candles and new carpet.   Exercise or Sports  Some people with asthma have this trigger. Be active!  Ask your doctor about taking medicine before sports or exercise to prevent symptoms.    Warm up for 5-10 minutes before and after sports or exercise.     Other Triggers of Asthma  Cold air:  Cover your nose and mouth with a scarf.  Sometimes laughing or crying can be a trigger.  Some medicines and food can trigger asthma.

## 2022-12-12 NOTE — PATIENT INSTRUCTIONS
"You can call the radiology department at [phone] to schedule your imaging test that was ordered.      Dr. Irene Kamara's book for everyone:  \"How to Loose Weight for the Last Time: Brain Based Solutions for Permanent Weight Loss\"   Also her Podcast: Weight loss for Busy Physicians *   *just insert your own career/barriers with any reference to busy physician world and you will still get phenomenal tips for the mindset changes needed for sustained weight loss.        Weight Loss Strategies for Success: (start small, pick 1 or 2 goals each week)    Stop snacking. Three meals a day only. This is THE MAJOR  for insulin resistance. We need to change the insulin resistance to have sustained weight loss.   Keep a food log- apps like [employer] are very helpful for this. (Not to pay attention to the calories but just to track the food).   Plan your food log in advance- Night prior: pick out what you want to eat for the next day for your 3 meals and stick to it: protein, healthy fat, veggie for example at every meal.  High protein (60g/day): Try to get protein in at least every 3.5 hours during the day to avoid a hunger surge late in the day.  If hungry, start with a protein serving, followed by water and then a crunchy vegetable that requires chewing (this decreases the hunger hormone).   Use plenty of healthy fats (olive oil, avocados, nuts) when cooking which will make you feel more satisfied.    Be intentional and think about what you are eating and feel the food fuel you.  Have a  \"Table, chair, plate\" with every meal. Sit down to eat your food!  Brush your teeth after the last meal of the day. Prevents evening snacking.   Avoid sugary beverages. (pop, fancy coffees). Reduce alcohol.   Drink water instead.   No fast food (or reduce meals out to a specific #/week). Eat at home 90% of the time.  Start the morning with breakfast.  Choose one day to be \"meal planning\" and/or \"meal prep\" days. Plan ahead for " grocery shopping for healthy food choices, and spend 30min-1hr each week prepping your fruits/veggies (wash, chop, store in easy grab portions).  Make it easy to grab the protein (cut up chicken breasts, greek yogurt containers, hard boiled eggs, etc)  Start low intensity exercise 30 minutes/day (walking/hiking/yard work).  Goal for 10,000 steps per day (consider a FitBit or other tracking device).  Daily weights can help- but just use it as a data point; don't get stuck in the thoughts around that number.

## 2022-12-14 ENCOUNTER — HOSPITAL ENCOUNTER (OUTPATIENT)
Dept: ULTRASOUND IMAGING | Facility: CLINIC | Age: 42
Discharge: HOME | End: 2022-12-14
Attending: FAMILY MEDICINE
Payer: COMMERCIAL

## 2022-12-14 ENCOUNTER — HOSPITAL ENCOUNTER (OUTPATIENT)
Dept: MAMMOGRAPHY | Facility: CLINIC | Age: 42
Discharge: HOME | End: 2022-12-14
Attending: FAMILY MEDICINE
Payer: COMMERCIAL

## 2022-12-14 DIAGNOSIS — D17.30 LIPOMA OF SKIN AND SUBCUTANEOUS TISSUE: ICD-10-CM

## 2022-12-14 DIAGNOSIS — Z12.31 ENCOUNTER FOR SCREENING MAMMOGRAM FOR BREAST CANCER: ICD-10-CM

## 2022-12-14 PROCEDURE — 76882 US LMTD JT/FCL EVL NVASC XTR: CPT | Mod: RT

## 2022-12-14 PROCEDURE — 77067 SCR MAMMO BI INCL CAD: CPT

## 2022-12-15 LAB
BKR LAB AP GYN ADEQUACY: NORMAL
BKR LAB AP GYN INTERPRETATION: NORMAL
BKR LAB AP HPV REFLEX: NORMAL
BKR LAB AP LMP: NORMAL
BKR LAB AP PREVIOUS ABNORMAL: NORMAL
PATH REPORT.COMMENTS IMP SPEC: NORMAL
PATH REPORT.COMMENTS IMP SPEC: NORMAL
PATH REPORT.RELEVANT HX SPEC: NORMAL

## 2022-12-19 LAB
HUMAN PAPILLOMA VIRUS 16 DNA: NEGATIVE
HUMAN PAPILLOMA VIRUS 18 DNA: NEGATIVE
HUMAN PAPILLOMA VIRUS FINAL DIAGNOSIS: NORMAL
HUMAN PAPILLOMA VIRUS OTHER HR: NEGATIVE

## 2023-11-13 ENCOUNTER — PATIENT OUTREACH (OUTPATIENT)
Dept: CARE COORDINATION | Facility: CLINIC | Age: 43
End: 2023-11-13
Payer: COMMERCIAL

## 2023-11-14 ENCOUNTER — PATIENT OUTREACH (OUTPATIENT)
Dept: CARE COORDINATION | Facility: CLINIC | Age: 43
End: 2023-11-14
Payer: COMMERCIAL

## 2023-11-27 ENCOUNTER — PATIENT OUTREACH (OUTPATIENT)
Dept: CARE COORDINATION | Facility: CLINIC | Age: 43
End: 2023-11-27
Payer: COMMERCIAL

## 2023-12-12 ENCOUNTER — PATIENT OUTREACH (OUTPATIENT)
Dept: CARE COORDINATION | Facility: CLINIC | Age: 43
End: 2023-12-12
Payer: COMMERCIAL

## 2023-12-28 ENCOUNTER — PATIENT OUTREACH (OUTPATIENT)
Dept: CARE COORDINATION | Facility: CLINIC | Age: 43
End: 2023-12-28
Payer: COMMERCIAL

## 2024-01-11 ENCOUNTER — PATIENT OUTREACH (OUTPATIENT)
Dept: CARE COORDINATION | Facility: CLINIC | Age: 44
End: 2024-01-11
Payer: COMMERCIAL

## 2024-02-15 ENCOUNTER — OFFICE VISIT (OUTPATIENT)
Dept: FAMILY MEDICINE | Facility: CLINIC | Age: 44
End: 2024-02-15
Payer: COMMERCIAL

## 2024-02-15 ENCOUNTER — ORDERS ONLY (AUTO-RELEASED) (OUTPATIENT)
Dept: FAMILY MEDICINE | Facility: CLINIC | Age: 44
End: 2024-02-15

## 2024-02-15 VITALS
HEIGHT: 64 IN | SYSTOLIC BLOOD PRESSURE: 118 MMHG | WEIGHT: 219 LBS | HEART RATE: 80 BPM | BODY MASS INDEX: 37.39 KG/M2 | DIASTOLIC BLOOD PRESSURE: 72 MMHG

## 2024-02-15 DIAGNOSIS — Z12.11 SCREEN FOR COLON CANCER: ICD-10-CM

## 2024-02-15 DIAGNOSIS — Z00.00 ROUTINE GENERAL MEDICAL EXAMINATION AT A HEALTH CARE FACILITY: Primary | ICD-10-CM

## 2024-02-15 DIAGNOSIS — J45.990 EXERCISE-INDUCED ASTHMA: ICD-10-CM

## 2024-02-15 DIAGNOSIS — Z12.31 ENCOUNTER FOR SCREENING MAMMOGRAM FOR BREAST CANCER: ICD-10-CM

## 2024-02-15 DIAGNOSIS — Z13.220 LIPID SCREENING: ICD-10-CM

## 2024-02-15 DIAGNOSIS — Z13.1 SCREENING FOR DIABETES MELLITUS: ICD-10-CM

## 2024-02-15 LAB
ANION GAP SERPL CALCULATED.3IONS-SCNC: 9 MMOL/L (ref 7–15)
BUN SERPL-MCNC: 11.3 MG/DL (ref 6–20)
CALCIUM SERPL-MCNC: 9.3 MG/DL (ref 8.6–10)
CHLORIDE SERPL-SCNC: 105 MMOL/L (ref 98–107)
CHOLEST SERPL-MCNC: 175 MG/DL
CREAT SERPL-MCNC: 0.65 MG/DL (ref 0.51–0.95)
DEPRECATED HCO3 PLAS-SCNC: 26 MMOL/L (ref 22–29)
EGFRCR SERPLBLD CKD-EPI 2021: >90 ML/MIN/1.73M2
FASTING STATUS PATIENT QL REPORTED: YES
GLUCOSE SERPL-MCNC: 86 MG/DL (ref 70–99)
HBA1C MFR BLD: 5.1 % (ref 0–5.6)
HDLC SERPL-MCNC: 57 MG/DL
LDLC SERPL CALC-MCNC: 104 MG/DL
NONHDLC SERPL-MCNC: 118 MG/DL
POTASSIUM SERPL-SCNC: 4 MMOL/L (ref 3.4–5.3)
SODIUM SERPL-SCNC: 140 MMOL/L (ref 135–145)
TRIGL SERPL-MCNC: 70 MG/DL

## 2024-02-15 PROCEDURE — 83036 HEMOGLOBIN GLYCOSYLATED A1C: CPT | Performed by: PHYSICIAN ASSISTANT

## 2024-02-15 PROCEDURE — 99396 PREV VISIT EST AGE 40-64: CPT | Performed by: PHYSICIAN ASSISTANT

## 2024-02-15 PROCEDURE — 80061 LIPID PANEL: CPT | Performed by: PHYSICIAN ASSISTANT

## 2024-02-15 PROCEDURE — 36415 COLL VENOUS BLD VENIPUNCTURE: CPT | Performed by: PHYSICIAN ASSISTANT

## 2024-02-15 PROCEDURE — 80048 BASIC METABOLIC PNL TOTAL CA: CPT | Performed by: PHYSICIAN ASSISTANT

## 2024-02-15 RX ORDER — ALBUTEROL SULFATE 90 UG/1
1-2 AEROSOL, METERED RESPIRATORY (INHALATION) EVERY 4 HOURS PRN
Qty: 18 G | Refills: 3 | Status: SHIPPED | OUTPATIENT
Start: 2024-02-15

## 2024-02-15 SDOH — HEALTH STABILITY: PHYSICAL HEALTH: ON AVERAGE, HOW MANY MINUTES DO YOU ENGAGE IN EXERCISE AT THIS LEVEL?: 30 MIN

## 2024-02-15 SDOH — HEALTH STABILITY: PHYSICAL HEALTH: ON AVERAGE, HOW MANY DAYS PER WEEK DO YOU ENGAGE IN MODERATE TO STRENUOUS EXERCISE (LIKE A BRISK WALK)?: 3 DAYS

## 2024-02-15 ASSESSMENT — ASTHMA QUESTIONNAIRES
QUESTION_4 LAST FOUR WEEKS HOW OFTEN HAVE YOU USED YOUR RESCUE INHALER OR NEBULIZER MEDICATION (SUCH AS ALBUTEROL): NOT AT ALL
QUESTION_3 LAST FOUR WEEKS HOW OFTEN DID YOUR ASTHMA SYMPTOMS (WHEEZING, COUGHING, SHORTNESS OF BREATH, CHEST TIGHTNESS OR PAIN) WAKE YOU UP AT NIGHT OR EARLIER THAN USUAL IN THE MORNING: NOT AT ALL
QUESTION_5 LAST FOUR WEEKS HOW WOULD YOU RATE YOUR ASTHMA CONTROL: COMPLETELY CONTROLLED
ACT_TOTALSCORE: 24
QUESTION_2 LAST FOUR WEEKS HOW OFTEN HAVE YOU HAD SHORTNESS OF BREATH: ONCE OR TWICE A WEEK
ACT_TOTALSCORE: 24
QUESTION_1 LAST FOUR WEEKS HOW MUCH OF THE TIME DID YOUR ASTHMA KEEP YOU FROM GETTING AS MUCH DONE AT WORK, SCHOOL OR AT HOME: NONE OF THE TIME

## 2024-02-15 ASSESSMENT — SOCIAL DETERMINANTS OF HEALTH (SDOH): HOW OFTEN DO YOU GET TOGETHER WITH FRIENDS OR RELATIVES?: THREE TIMES A WEEK

## 2024-02-15 NOTE — PATIENT INSTRUCTIONS
Goals:  Protein- 60-80 grams daily  Fiber 25 grams daily  Water 64 ounces daily  Calories 9436-3107 daily  Myfitness pal or other tracking [employer]    Preventive Care Advice   This is general advice given by our system to help you stay healthy. However, your care team may have specific advice just for you. Please talk to your care team about your preventive care needs.  Nutrition  Eat 5 or more servings of fruits and vegetables each day.  Try wheat bread, brown rice and whole grain pasta (instead of white bread, rice, and pasta).  Get enough calcium and vitamin D. Check the label on foods and aim for 100% of the RDA (recommended daily allowance).  Lifestyle  Exercise at least 150 minutes each week  (30 minutes a day, 5 days a week).  Do muscle strengthening activities 2 days a week. These help control your weight and prevent disease.  No smoking.  Wear sunscreen to prevent skin cancer.  Have a dental exam and cleaning every 6 months.  Yearly exams  See your health care team every year to talk about:  Any changes in your health.  Any medicines your care team has prescribed.  Preventive care, family planning, and ways to prevent chronic diseases.  Shots (vaccines)   HPV shots (up to age 26), if you've never had them before.  Hepatitis B shots (up to age 59), if you've never had them before.  COVID-19 shot: Get this shot when it's due.  Flu shot: Get a flu shot every year.  Tetanus shot: Get a tetanus shot every 10 years.  Pneumococcal, hepatitis A, and RSV shots: Ask your care team if you need these based on your risk.  Shingles shot (for age 50 and up)  General health tests  Diabetes screening:  Starting at age 35, Get screened for diabetes at least every 3 years.  If you are younger than age 35, ask your care team if you should be screened for diabetes.  Cholesterol test: At age 39, start having a cholesterol test every 5 years, or more often if advised.  Bone density scan (DEXA): At age 50, ask your care team if you  should have this scan for osteoporosis (brittle bones).  Hepatitis C: Get tested at least once in your life.  STIs (sexually transmitted infections)  Before age 24: Ask your care team if you should be screened for STIs.  After age 24: Get screened for STIs if you're at risk. You are at risk for STIs (including HIV) if:  You are sexually active with more than one person.  You don't use condoms every time.  You or a partner was diagnosed with a sexually transmitted infection.  If you are at risk for HIV, ask about PrEP medicine to prevent HIV.  Get tested for HIV at least once in your life, whether you are at risk for HIV or not.  Cancer screening tests  Cervical cancer screening: If you have a cervix, begin getting regular cervical cancer screening tests starting at age 21.  Breast cancer scan (mammogram): If you've ever had breasts, begin having regular mammograms starting at age 40. This is a scan to check for breast cancer.  Colon cancer screening: It is important to start screening for colon cancer at age 45.  Have a colonoscopy test every 10 years (or more often if you're at risk) Or, ask your provider about stool tests like a FIT test every year or Cologuard test every 3 years.  To learn more about your testing options, visit:   https://[email or web address]/814575.pdf.  For help making a decision, visit:   https://bit.ly/uf02827.  Prostate cancer screening test: If you have a prostate, ask your care team if a prostate cancer screening test (PSA) at age 55 is right for you.  Lung cancer screening: If you are a current or former smoker ages 50 to 80, ask your care team if ongoing lung cancer screenings are right for you.  For informational purposes only. Not to replace the advice of your health care provider. Copyright   2023 [employer]. All rights reserved. Clinically reviewed by the [facility] Transitions Program. Digital Signal [id number] - REV 01/24.    Learning About Stress  What is stress?      Stress is your body's response to a hard situation. Your body can have a physical, emotional, or mental response. Stress is a fact of life for most people, and it affects everyone differently. What causes stress for you may not be stressful for someone else.  A lot of things can cause stress. You may feel stress when you go on a job interview, take a test, or run a race. This kind of short-term stress is normal and even useful. It can help you if you need to work hard or react quickly. For example, stress can help you finish an important job on time.  Long-term stress is caused by ongoing stressful situations or events. Examples of long-term stress include long-term health problems, ongoing problems at work, or conflicts in your family. Long-term stress can harm your health.  How does stress affect your health?  When you are stressed, your body responds as though you are in danger. It makes hormones that speed up your heart, make you breathe faster, and give you a burst of energy. This is called the fight-or-flight stress response. If the stress is over quickly, your body goes back to normal and no harm is done.  But if stress happens too often or lasts too long, it can have bad effects. Long-term stress can make you more likely to get sick, and it can make symptoms of some diseases worse. If you tense up when you are stressed, you may develop neck, shoulder, or low back pain. Stress is linked to high blood pressure and heart disease.  Stress also harms your emotional health. It can make you baird, tense, or depressed. Your relationships may suffer, and you may not do well at work or school.  What can you do to manage stress?  You can try these things to help manage stress:   Do something active. Exercise or activity can help reduce stress. Walking is a great way to get started. Even everyday activities such as housecleaning or yard work can help.  Try yoga or jenny chi. These techniques combine exercise and  meditation. You may need some training at first to learn them.  Do something you enjoy. For example, listen to music or go to a movie. Practice your hobby or do volunteer work.  Meditate. This can help you relax, because you are not worrying about what happened before or what may happen in the future.  Do guided imagery. Imagine yourself in any setting that helps you feel calm. You can use online videos, books, or a teacher to guide you.  Do breathing exercises. For example:  From a standing position, bend forward from the waist with your knees slightly bent. Let your arms dangle close to the floor.  Breathe in slowly and deeply as you return to a standing position. Roll up slowly and lift your head last.  Hold your breath for just a few seconds in the standing position.  Breathe out slowly and bend forward from the waist.  Let your feelings out. Talk, laugh, cry, and express anger when you need to. Talking with supportive friends or family, a counselor, or a ismael leader about your feelings is a healthy way to relieve stress. Avoid discussing your feelings with people who make you feel worse.  Write. It may help to write about things that are bothering you. This helps you find out how much stress you feel and what is causing it. When you know this, you can find better ways to cope.  What can you do to prevent stress?  You might try some of these things to help prevent stress:  Manage your time. This helps you find time to do the things you want and need to do.  Get enough sleep. Your body recovers from the stresses of the day while you are sleeping.  Get support. Your family, friends, and community can make a difference in how you experience stress.  Limit your news feed. Avoid or limit time on social media or news that may make you feel stressed.  Do something active. Exercise or activity can help reduce stress. Walking is a great way to get started.  Where can you learn more?  Go to  "https://www.[employer]net/patiented  Enter N032 in the search box to learn more about \"Learning About Stress.\"  Current as of: February 26, 2023               Content Version: 13.8    0801-0078 [employer].   Care instructions adapted under license by your healthcare professional. If you have questions about a medical condition or this instruction, always ask your healthcare professional. [employer], [employer] disclaims any warranty or liability for your use of this information.      "

## 2024-02-15 NOTE — PROGRESS NOTES
Nikunj Gutierrez is a 43 year old, presenting for the following:  Physical (Pt is fasting)        2/15/2024     9:49 AM   Additional Questions   Roomed by Tayo   Accompanied by self        Health Care Directive  Patient does not have a Health Care Directive or Living Will: Discussed advance care planning with patient; however, patient declined at this time.    HPI    Weight loss-would like to lose weight notes she is active and running her children around does not get an specific exercise she eats fruit but does not care for vegetables      2/15/2024   General Health   How would you rate your overall physical health? Good   Feel stress (tense, anxious, or unable to sleep) Only a little   (!) STRESS CONCERN      2/15/2024   Nutrition   Three or more servings of calcium each day? Yes   Diet: Regular (no restrictions)   How many servings of fruit and vegetables per day? (!) 2-3   How many sweetened beverages each day? 0-1         2/15/2024   Exercise   Days per week of moderate/strenous exercise 3 days   Average minutes spent exercising at this level 30 min         2/15/2024   Social Factors   Frequency of gathering with friends or relatives Three times a week   Worry food won't last until get money to buy more No   Food not last or not have enough money for food? No   Do you have housing?  Yes   Are you worried about losing your housing? No   Lack of transportation? No   Unable to get utilities (heat,electricity)? No         2/15/2024   Dental   Dentist two times every year? Yes         2/15/2024   TB Screening   Were you born outside of [place]?  No         Today's PHQ-2 Score:       2/15/2024     9:50 AM   PHQ-2 ( 1999 [employer])   Q1: Little interest or pleasure in doing things 0   Q2: Feeling down, depressed or hopeless 0   PHQ-2 Score 0   Q1: Little interest or pleasure in doing things Not at all   Q2: Feeling down, depressed or hopeless Not at all   PHQ-2 Score 0           2/15/2024   Substance Use   Alcohol  more than 3/day or more than 7/wk Not Applicable   Do you use any other substances recreationally? No     Social History     Tobacco Use    Smoking status: Never     Passive exposure: Never    Smokeless tobacco: Never   Vaping Use    Vaping Use: Never used   Substance Use Topics    Alcohol use: No    Drug use: No             2/15/2024   Breast Cancer Screening   Family history of breast, colon, or ovarian cancer? No / Unknown      Mammogram Screening - Mammogram every 1-2 years updated in Health Maintenance based on mutual decision making          2/15/2024   One time HIV Screening   Previous HIV test? No         2/15/2024   STI Screening   New sexual partner(s) since last STI/HIV test? No     History of abnormal Pap smear: NO - age 30-65 PAP every 5 years with negative HPV co-testing recommended        Latest Ref Rng & Units 12/12/2022     3:05 PM 8/3/2015    12:00 AM   PAP / HPV   PAP  Negative for Intraepithelial Lesion or Malignancy (NILM)     HPV 16 DNA Negative Negative     HPV 18 DNA Negative Negative     HPV_EXT - HISTORICAL   See Scanned Report    Other HR HPV Negative Negative       The 10-year ASCVD risk score ([place] MCDANIELS, et al., 2019) is: 0.6%    Values used to calculate the score:      Age: 43 years      Sex: Female      Is Non- : No      Diabetic: No      Tobacco smoker: No      Systolic Blood Pressure: 118 mmHg      Is BP treated: No      HDL Cholesterol: 61 mg/dL      Total Cholesterol: 208 mg/dL        2/15/2024   Contraception/Family Planning   Questions about contraception or family planning No        Reviewed and updated as needed this visit by Provider   Tobacco  Allergies  Meds  Problems  Med Hx  Surg Hx  Fam Hx              Review of Systems  Constitutional, HEENT, cardiovascular, pulmonary, gi and gu systems are negative, except as otherwise noted.     Objective    Exam  /72 (BP Location: Left arm, Patient Position: Sitting, Cuff Size: Adult Regular)    "Pulse 80   Ht 1.619 m (5' 3.75\")   Wt 99.3 kg (219 lb)   BMI 37.89 kg/m     Estimated body mass index is 37.89 kg/m  as calculated from the following:    Height as of this encounter: 1.619 m (5' 3.75\").    Weight as of this encounter: 99.3 kg (219 lb).    Physical Exam  GENERAL: alert and no distress  EYES: Eyes grossly normal to inspection, PERRL and conjunctivae and sclerae normal  HENT: ear canals and TM's normal, nose and mouth without ulcers or lesions  NECK: no adenopathy, no asymmetry, masses, or scars  RESP: lungs clear to auscultation - no rales, rhonchi or wheezes  CV: regular rate and rhythm, normal S1 S2, no S3 or S4, no murmur, click or rub, no peripheral edema  ABDOMEN: soft, nontender, no hepatosplenomegaly, no masses and bowel sounds normal  MS: no gross musculoskeletal defects noted, no edema  SKIN: no suspicious lesions or rashes  NEURO: Normal strength and tone, mentation intact and speech normal  PSYCH: mentation appears normal, affect normal/bright    1. Screen for colon cancer  - COLOGUARD(EXACT SCIENCES); Future    2. Routine general medical examination at a health care facility  - albuterol (PROAIR HFA/PROVENTIL HFA/VENTOLIN HFA) 108 (90 Base) MCG/ACT inhaler; Inhale 1-2 puffs into the lungs every 4 hours as needed for wheezing  Dispense: 18 g; Refill: 3  - Basic metabolic panel  (Ca, Cl, CO2, Creat, Gluc, K, Na, BUN); Future  - Basic metabolic panel  (Ca, Cl, CO2, Creat, Gluc, K, Na, BUN)    3. Exercise-induced asthma  - albuterol (PROAIR HFA/PROVENTIL HFA/VENTOLIN HFA) 108 (90 Base) MCG/ACT inhaler; Inhale 1-2 puffs into the lungs every 4 hours as needed for wheezing  Dispense: 18 g; Refill: 3    4. Encounter for screening mammogram for breast cancer  - *MA Screening Digital Bilateral; Future    5. Screening for diabetes mellitus  - Hemoglobin A1c; Future  - Hemoglobin A1c    6. Lipid screening  - Lipid Profile (Chol, Trig, HDL, LDL calc); Future  - Lipid Profile (Chol, Trig, HDL, LDL " calc)    Pt will work on diet and exercise for weight loss.  Goals given for protein, fiber and water. She would also qualify for medications if she is interested.     Signed Electronically by: Elizabeth Mills PA-C

## 2024-11-14 ENCOUNTER — PATIENT OUTREACH (OUTPATIENT)
Dept: CARE COORDINATION | Facility: CLINIC | Age: 44
End: 2024-11-14
Payer: COMMERCIAL

## 2024-12-12 ENCOUNTER — PATIENT OUTREACH (OUTPATIENT)
Dept: CARE COORDINATION | Facility: CLINIC | Age: 44
End: 2024-12-12
Payer: COMMERCIAL

## 2025-01-08 ENCOUNTER — PATIENT OUTREACH (OUTPATIENT)
Dept: CARE COORDINATION | Facility: CLINIC | Age: 45
End: 2025-01-08
Payer: COMMERCIAL

## 2025-01-16 ENCOUNTER — PATIENT OUTREACH (OUTPATIENT)
Dept: CARE COORDINATION | Facility: CLINIC | Age: 45
End: 2025-01-16
Payer: COMMERCIAL

## 2025-01-30 ENCOUNTER — PATIENT OUTREACH (OUTPATIENT)
Dept: CARE COORDINATION | Facility: CLINIC | Age: 45
End: 2025-01-30
Payer: COMMERCIAL

## 2025-02-16 ENCOUNTER — HEALTH MAINTENANCE LETTER (OUTPATIENT)
Age: 45
End: 2025-02-16

## 2025-03-16 ENCOUNTER — HEALTH MAINTENANCE LETTER (OUTPATIENT)
Age: 45
End: 2025-03-16